# Patient Record
Sex: MALE | Race: OTHER | NOT HISPANIC OR LATINO | URBAN - METROPOLITAN AREA
[De-identification: names, ages, dates, MRNs, and addresses within clinical notes are randomized per-mention and may not be internally consistent; named-entity substitution may affect disease eponyms.]

---

## 2020-01-25 ENCOUNTER — INPATIENT (INPATIENT)
Facility: HOSPITAL | Age: 80
LOS: 2 days | Discharge: HOSPICE | DRG: 542 | End: 2020-01-28
Attending: INTERNAL MEDICINE | Admitting: INTERNAL MEDICINE
Payer: MEDICARE

## 2020-01-25 VITALS
DIASTOLIC BLOOD PRESSURE: 70 MMHG | HEIGHT: 69 IN | TEMPERATURE: 98 F | RESPIRATION RATE: 20 BRPM | HEART RATE: 76 BPM | SYSTOLIC BLOOD PRESSURE: 111 MMHG | OXYGEN SATURATION: 97 %

## 2020-01-25 DIAGNOSIS — E87.6 HYPOKALEMIA: ICD-10-CM

## 2020-01-25 DIAGNOSIS — Z98.49 CATARACT EXTRACTION STATUS, UNSPECIFIED EYE: Chronic | ICD-10-CM

## 2020-01-25 DIAGNOSIS — I26.99 OTHER PULMONARY EMBOLISM WITHOUT ACUTE COR PULMONALE: ICD-10-CM

## 2020-01-25 DIAGNOSIS — C61 MALIGNANT NEOPLASM OF PROSTATE: ICD-10-CM

## 2020-01-25 DIAGNOSIS — J84.112 IDIOPATHIC PULMONARY FIBROSIS: ICD-10-CM

## 2020-01-25 DIAGNOSIS — B37.81 CANDIDAL ESOPHAGITIS: ICD-10-CM

## 2020-01-25 DIAGNOSIS — I20.0 UNSTABLE ANGINA: ICD-10-CM

## 2020-01-25 DIAGNOSIS — Z98.890 OTHER SPECIFIED POSTPROCEDURAL STATES: Chronic | ICD-10-CM

## 2020-01-25 DIAGNOSIS — I10 ESSENTIAL (PRIMARY) HYPERTENSION: ICD-10-CM

## 2020-01-25 LAB
ALBUMIN SERPL ELPH-MCNC: 3.1 G/DL — LOW (ref 3.3–5)
ALP SERPL-CCNC: 59 U/L — SIGNIFICANT CHANGE UP (ref 40–120)
ALT FLD-CCNC: 17 U/L — SIGNIFICANT CHANGE UP (ref 10–45)
ANION GAP SERPL CALC-SCNC: 18 MMOL/L — HIGH (ref 5–17)
ANION GAP SERPL CALC-SCNC: 19 MMOL/L — HIGH (ref 5–17)
APTT BLD: 45.3 SEC — HIGH (ref 27.5–36.3)
AST SERPL-CCNC: 22 U/L — SIGNIFICANT CHANGE UP (ref 10–40)
BILIRUB SERPL-MCNC: 0.4 MG/DL — SIGNIFICANT CHANGE UP (ref 0.2–1.2)
BUN SERPL-MCNC: 15 MG/DL — SIGNIFICANT CHANGE UP (ref 7–23)
BUN SERPL-MCNC: 17 MG/DL — SIGNIFICANT CHANGE UP (ref 7–23)
CALCIUM SERPL-MCNC: 8.7 MG/DL — SIGNIFICANT CHANGE UP (ref 8.4–10.5)
CALCIUM SERPL-MCNC: 9.1 MG/DL — SIGNIFICANT CHANGE UP (ref 8.4–10.5)
CHLORIDE SERPL-SCNC: 102 MMOL/L — SIGNIFICANT CHANGE UP (ref 96–108)
CHLORIDE SERPL-SCNC: 98 MMOL/L — SIGNIFICANT CHANGE UP (ref 96–108)
CK MB CFR SERPL CALC: 2 NG/ML — SIGNIFICANT CHANGE UP (ref 0–6.7)
CK MB CFR SERPL CALC: 2.2 NG/ML — SIGNIFICANT CHANGE UP (ref 0–6.7)
CK SERPL-CCNC: 68 U/L — SIGNIFICANT CHANGE UP (ref 30–200)
CO2 SERPL-SCNC: 23 MMOL/L — SIGNIFICANT CHANGE UP (ref 22–31)
CO2 SERPL-SCNC: 27 MMOL/L — SIGNIFICANT CHANGE UP (ref 22–31)
CREAT SERPL-MCNC: 0.74 MG/DL — SIGNIFICANT CHANGE UP (ref 0.5–1.3)
CREAT SERPL-MCNC: 0.83 MG/DL — SIGNIFICANT CHANGE UP (ref 0.5–1.3)
GLUCOSE SERPL-MCNC: 143 MG/DL — HIGH (ref 70–99)
GLUCOSE SERPL-MCNC: 97 MG/DL — SIGNIFICANT CHANGE UP (ref 70–99)
HBA1C BLD-MCNC: 5.3 % — SIGNIFICANT CHANGE UP (ref 4–5.6)
HCT VFR BLD CALC: 28.7 % — LOW (ref 39–50)
HGB BLD-MCNC: 9.3 G/DL — LOW (ref 13–17)
INR BLD: 2.28 — HIGH (ref 0.88–1.16)
INR BLD: 2.91 — HIGH (ref 0.88–1.16)
LIDOCAIN IGE QN: 25 U/L — SIGNIFICANT CHANGE UP (ref 7–60)
MAGNESIUM SERPL-MCNC: 1.6 MG/DL — SIGNIFICANT CHANGE UP (ref 1.6–2.6)
MAGNESIUM SERPL-MCNC: 1.7 MG/DL — SIGNIFICANT CHANGE UP (ref 1.6–2.6)
MCHC RBC-ENTMCNC: 31 PG — SIGNIFICANT CHANGE UP (ref 27–34)
MCHC RBC-ENTMCNC: 32.4 GM/DL — SIGNIFICANT CHANGE UP (ref 32–36)
MCV RBC AUTO: 95.7 FL — SIGNIFICANT CHANGE UP (ref 80–100)
NRBC # BLD: 0 /100 WBCS — SIGNIFICANT CHANGE UP (ref 0–0)
NT-PROBNP SERPL-SCNC: 1018 PG/ML — HIGH (ref 0–300)
PLATELET # BLD AUTO: 350 K/UL — SIGNIFICANT CHANGE UP (ref 150–400)
POTASSIUM SERPL-MCNC: 3 MMOL/L — LOW (ref 3.5–5.3)
POTASSIUM SERPL-MCNC: 3.3 MMOL/L — LOW (ref 3.5–5.3)
POTASSIUM SERPL-SCNC: 3 MMOL/L — LOW (ref 3.5–5.3)
POTASSIUM SERPL-SCNC: 3.3 MMOL/L — LOW (ref 3.5–5.3)
PROT SERPL-MCNC: 6.5 G/DL — SIGNIFICANT CHANGE UP (ref 6–8.3)
PROTHROM AB SERPL-ACNC: 26.7 SEC — HIGH (ref 10–12.9)
PROTHROM AB SERPL-ACNC: 34.3 SEC — HIGH (ref 10–12.9)
RBC # BLD: 3 M/UL — LOW (ref 4.2–5.8)
RBC # FLD: 14.4 % — SIGNIFICANT CHANGE UP (ref 10.3–14.5)
SODIUM SERPL-SCNC: 143 MMOL/L — SIGNIFICANT CHANGE UP (ref 135–145)
SODIUM SERPL-SCNC: 144 MMOL/L — SIGNIFICANT CHANGE UP (ref 135–145)
TROPONIN T SERPL-MCNC: 0.01 NG/ML — SIGNIFICANT CHANGE UP (ref 0–0.01)
TROPONIN T SERPL-MCNC: 0.01 NG/ML — SIGNIFICANT CHANGE UP (ref 0–0.01)
TSH SERPL-MCNC: 1.35 UIU/ML — SIGNIFICANT CHANGE UP (ref 0.35–4.94)
WBC # BLD: 6.94 K/UL — SIGNIFICANT CHANGE UP (ref 3.8–10.5)
WBC # FLD AUTO: 6.94 K/UL — SIGNIFICANT CHANGE UP (ref 3.8–10.5)

## 2020-01-25 PROCEDURE — 93010 ELECTROCARDIOGRAM REPORT: CPT

## 2020-01-25 PROCEDURE — 71045 X-RAY EXAM CHEST 1 VIEW: CPT | Mod: 26

## 2020-01-25 PROCEDURE — 71275 CT ANGIOGRAPHY CHEST: CPT | Mod: 26

## 2020-01-25 PROCEDURE — 99291 CRITICAL CARE FIRST HOUR: CPT

## 2020-01-25 RX ORDER — CARVEDILOL PHOSPHATE 80 MG/1
12.5 CAPSULE, EXTENDED RELEASE ORAL EVERY 12 HOURS
Refills: 0 | Status: DISCONTINUED | OUTPATIENT
Start: 2020-01-25 | End: 2020-01-28

## 2020-01-25 RX ORDER — SODIUM CHLORIDE 9 MG/ML
1000 INJECTION INTRAMUSCULAR; INTRAVENOUS; SUBCUTANEOUS ONCE
Refills: 0 | Status: COMPLETED | OUTPATIENT
Start: 2020-01-25 | End: 2020-01-25

## 2020-01-25 RX ORDER — LOSARTAN POTASSIUM 100 MG/1
1 TABLET, FILM COATED ORAL
Qty: 0 | Refills: 0 | DISCHARGE

## 2020-01-25 RX ORDER — ASCORBIC ACID 60 MG
500 TABLET,CHEWABLE ORAL
Refills: 0 | Status: DISCONTINUED | OUTPATIENT
Start: 2020-01-25 | End: 2020-01-26

## 2020-01-25 RX ORDER — POTASSIUM CHLORIDE 20 MEQ
40 PACKET (EA) ORAL ONCE
Refills: 0 | Status: DISCONTINUED | OUTPATIENT
Start: 2020-01-25 | End: 2020-01-25

## 2020-01-25 RX ORDER — ASCORBIC ACID 60 MG
1 TABLET,CHEWABLE ORAL
Qty: 0 | Refills: 0 | DISCHARGE

## 2020-01-25 RX ORDER — SUCRALFATE 1 G
0.5 TABLET ORAL
Refills: 0 | Status: DISCONTINUED | OUTPATIENT
Start: 2020-01-25 | End: 2020-01-28

## 2020-01-25 RX ORDER — LOSARTAN POTASSIUM 100 MG/1
50 TABLET, FILM COATED ORAL DAILY
Refills: 0 | Status: DISCONTINUED | OUTPATIENT
Start: 2020-01-25 | End: 2020-01-28

## 2020-01-25 RX ORDER — ATORVASTATIN CALCIUM 80 MG/1
80 TABLET, FILM COATED ORAL AT BEDTIME
Refills: 0 | Status: DISCONTINUED | OUTPATIENT
Start: 2020-01-25 | End: 2020-01-26

## 2020-01-25 RX ORDER — NITROGLYCERIN 6.5 MG
1 CAPSULE, EXTENDED RELEASE ORAL
Qty: 0 | Refills: 0 | DISCHARGE

## 2020-01-25 RX ORDER — POTASSIUM CHLORIDE 20 MEQ
40 PACKET (EA) ORAL ONCE
Refills: 0 | Status: COMPLETED | OUTPATIENT
Start: 2020-01-25 | End: 2020-01-25

## 2020-01-25 RX ORDER — CARVEDILOL PHOSPHATE 80 MG/1
1 CAPSULE, EXTENDED RELEASE ORAL
Qty: 0 | Refills: 0 | DISCHARGE

## 2020-01-25 RX ORDER — ASPIRIN/CALCIUM CARB/MAGNESIUM 324 MG
325 TABLET ORAL ONCE
Refills: 0 | Status: COMPLETED | OUTPATIENT
Start: 2020-01-25 | End: 2020-01-25

## 2020-01-25 RX ORDER — APIXABAN 2.5 MG/1
5 TABLET, FILM COATED ORAL EVERY 12 HOURS
Refills: 0 | Status: DISCONTINUED | OUTPATIENT
Start: 2020-01-27 | End: 2020-01-28

## 2020-01-25 RX ORDER — NITROGLYCERIN 6.5 MG
0.4 CAPSULE, EXTENDED RELEASE ORAL ONCE
Refills: 0 | Status: COMPLETED | OUTPATIENT
Start: 2020-01-25 | End: 2020-01-25

## 2020-01-25 RX ORDER — LANOLIN ALCOHOL/MO/W.PET/CERES
6 CREAM (GRAM) TOPICAL AT BEDTIME
Refills: 0 | Status: DISCONTINUED | OUTPATIENT
Start: 2020-01-25 | End: 2020-01-28

## 2020-01-25 RX ORDER — BACLOFEN 100 %
1 POWDER (GRAM) MISCELLANEOUS
Qty: 0 | Refills: 0 | DISCHARGE

## 2020-01-25 RX ORDER — LATANOPROST 0.05 MG/ML
1 SOLUTION/ DROPS OPHTHALMIC; TOPICAL AT BEDTIME
Refills: 0 | Status: DISCONTINUED | OUTPATIENT
Start: 2020-01-25 | End: 2020-01-28

## 2020-01-25 RX ORDER — PREDNISOLONE SODIUM PHOSPHATE 1 %
1 DROPS OPHTHALMIC (EYE)
Qty: 0 | Refills: 0 | DISCHARGE

## 2020-01-25 RX ORDER — BIMATOPROST 0.3 MG/ML
1 SOLUTION/ DROPS OPHTHALMIC
Qty: 0 | Refills: 0 | DISCHARGE

## 2020-01-25 RX ORDER — PREDNISOLONE SODIUM PHOSPHATE 1 %
1 DROPS OPHTHALMIC (EYE) DAILY
Refills: 0 | Status: DISCONTINUED | OUTPATIENT
Start: 2020-01-25 | End: 2020-01-28

## 2020-01-25 RX ORDER — LANOLIN ALCOHOL/MO/W.PET/CERES
2 CREAM (GRAM) TOPICAL
Qty: 0 | Refills: 0 | DISCHARGE

## 2020-01-25 RX ORDER — TRAMADOL HYDROCHLORIDE 50 MG/1
25 TABLET ORAL ONCE
Refills: 0 | Status: DISCONTINUED | OUTPATIENT
Start: 2020-01-25 | End: 2020-01-25

## 2020-01-25 RX ORDER — APIXABAN 2.5 MG/1
10 TABLET, FILM COATED ORAL
Refills: 0 | Status: COMPLETED | OUTPATIENT
Start: 2020-01-25 | End: 2020-01-27

## 2020-01-25 RX ORDER — POLYETHYLENE GLYCOL 3350 17 G/17G
17 POWDER, FOR SOLUTION ORAL DAILY
Refills: 0 | Status: DISCONTINUED | OUTPATIENT
Start: 2020-01-25 | End: 2020-01-28

## 2020-01-25 RX ORDER — DORZOLAMIDE HYDROCHLORIDE TIMOLOL MALEATE 20; 5 MG/ML; MG/ML
1 SOLUTION/ DROPS OPHTHALMIC
Qty: 0 | Refills: 0 | DISCHARGE

## 2020-01-25 RX ORDER — DORZOLAMIDE HYDROCHLORIDE TIMOLOL MALEATE 20; 5 MG/ML; MG/ML
1 SOLUTION/ DROPS OPHTHALMIC
Refills: 0 | Status: DISCONTINUED | OUTPATIENT
Start: 2020-01-25 | End: 2020-01-28

## 2020-01-25 RX ORDER — ASPIRIN/CALCIUM CARB/MAGNESIUM 324 MG
81 TABLET ORAL DAILY
Refills: 0 | Status: DISCONTINUED | OUTPATIENT
Start: 2020-01-26 | End: 2020-01-26

## 2020-01-25 RX ORDER — SUCRALFATE 1 G
5 TABLET ORAL
Qty: 0 | Refills: 0 | DISCHARGE

## 2020-01-25 RX ORDER — SENNA PLUS 8.6 MG/1
2 TABLET ORAL AT BEDTIME
Refills: 0 | Status: DISCONTINUED | OUTPATIENT
Start: 2020-01-25 | End: 2020-01-28

## 2020-01-25 RX ADMIN — TRAMADOL HYDROCHLORIDE 25 MILLIGRAM(S): 50 TABLET ORAL at 23:10

## 2020-01-25 RX ADMIN — LOSARTAN POTASSIUM 50 MILLIGRAM(S): 100 TABLET, FILM COATED ORAL at 16:44

## 2020-01-25 RX ADMIN — Medication 0.4 MILLIGRAM(S): at 09:15

## 2020-01-25 RX ADMIN — Medication 325 MILLIGRAM(S): at 09:09

## 2020-01-25 RX ADMIN — SENNA PLUS 2 TABLET(S): 8.6 TABLET ORAL at 22:08

## 2020-01-25 RX ADMIN — TRAMADOL HYDROCHLORIDE 25 MILLIGRAM(S): 50 TABLET ORAL at 22:07

## 2020-01-25 RX ADMIN — CARVEDILOL PHOSPHATE 12.5 MILLIGRAM(S): 80 CAPSULE, EXTENDED RELEASE ORAL at 16:44

## 2020-01-25 RX ADMIN — SODIUM CHLORIDE 1000 MILLILITER(S): 9 INJECTION INTRAMUSCULAR; INTRAVENOUS; SUBCUTANEOUS at 10:45

## 2020-01-25 RX ADMIN — Medication 40 MILLIEQUIVALENT(S): at 22:08

## 2020-01-25 RX ADMIN — Medication 40 MILLIEQUIVALENT(S): at 10:51

## 2020-01-25 RX ADMIN — DORZOLAMIDE HYDROCHLORIDE TIMOLOL MALEATE 1 DROP(S): 20; 5 SOLUTION/ DROPS OPHTHALMIC at 22:09

## 2020-01-25 RX ADMIN — Medication 6 MILLIGRAM(S): at 22:09

## 2020-01-25 RX ADMIN — ATORVASTATIN CALCIUM 80 MILLIGRAM(S): 80 TABLET, FILM COATED ORAL at 22:10

## 2020-01-25 RX ADMIN — Medication 1 DROP(S): at 22:09

## 2020-01-25 RX ADMIN — LATANOPROST 1 DROP(S): 0.05 SOLUTION/ DROPS OPHTHALMIC; TOPICAL at 22:11

## 2020-01-25 NOTE — ED ADULT NURSE NOTE - OBJECTIVE STATEMENT
78 yo M pmhx cardiac stent placement, DVT/ PE, hyperlipidemia, emphysema, ballder CA/ bone CA B[facility]EMS [facility] c/o chest pain since waking up this AM. Pt reports crushing chest pain, made better with rest and worse with movement changes. PT recevied 81mg of ASA and 1 tab of Nitro this AM at 7:10. upon arrival pt rates pain 7/10, denies radiation. EKG completed upon arrival, Cath lab notified. Pt is AOx3, lung sounds diminished, skin warm and dry. Pt reporting back pain despite lidocaine patch present on back, BP WDL bilaterally. Pt denies SOB, V/D, dizziness, lightheadedness, on cardiac monitor, will continue to assess.

## 2020-01-25 NOTE — ED PROVIDER NOTE - CLINICAL SUMMARY MEDICAL DECISION MAKING FREE TEXT BOX
Patient in ED from [facility] concern for CP today.  EKG completed on arrival and concerning for wellen's and case is discussed with interventional cardiologist.  Interventionalist, Dr. Mayo, who has reviewed EKG and requests cardiology fellow to come evaluate patient in ED.  Cardio fellow in ED to evaluate and bedside echo is performed indicating a preserved EF.  Initial trop negative.  Patient with some CP relief with SL nitro.  He is given ASA.  CXR showing bilateral infiltrates - no leukocytosis, no fever. Patient in ED from [facility] concern for CP today.  EKG completed on arrival and concerning for wellen's and case is discussed with interventional cardiologist.  Interventionalist, Dr. Mayo, who has reviewed EKG and requests cardiology fellow to come evaluate patient in ED.  Cardio fellow in ED to evaluate and bedside echo is performed indicating a preserved EF.  Initial trop negative.  Patient with some CP relief with SL nitro.  He is given ASA.  CXR showing bilateral infiltrates - no leukocytosis, no fever.  CTA completed and without evidence of PE.  Low clinical concern for PNA, will hold off on abx therapy at this time.  Case is discussed with admitting cardiologist on call, Dr. Trujillo, who is in agreement to telemetry admission.  Plan is discussed with patient as well as his wife via telephone conversation - both of whom are in agreement.  Patient is stable and ready for admission at this time.

## 2020-01-25 NOTE — H&P ADULT - NSHPSOCIALHISTORY_GEN_ALL_CORE
-former smoker (quit 34 yrs ago, 3PPD x25 years)  -no etoh or illicits  -currently at [employer]  -has spouse and adult child  -retired

## 2020-01-25 NOTE — ED ADULT NURSE REASSESSMENT NOTE - NS ED NURSE REASSESS COMMENT FT1
Pt given sublingual nitro 0.4 tablet, prior to administration BP 140s/80s s/p administration BP drops to 60s/40s, pt given 400mL NS with pressure bag, repeat EKG completed, cardiology consulted. Pt moved to resus room where BP stabilized to 120s/70s. Pt taken to CTA to r/o PE/ dissection. Currently 156/82, BP WDL bilaterally, sating 98% on 2L, on continuous cardiac monitor, will continue to monitor.

## 2020-01-25 NOTE — H&P ADULT - PROBLEM SELECTOR PLAN 4
-not on home o2  -continue to monitor #COPD    -not on home o2  -CTA chest revealed pulmonary emphysema and pulmonary fibrosis; Probable bronchial mucoid impaction in the right lower lobe. Cannot exclude superimposed pneumonia in the right lower lobe  -afebrile, no leukocytosis, and minimal cough   -no abx at this time, continue to monitor

## 2020-01-25 NOTE — H&P ADULT - PROBLEM SELECTOR PLAN 2
-SBP 180s-190s on [facility]  -continue home coreg and losartan  -continue to monitor    #Hypotension  -BP to 60s/40s in ER s/p nitro, normalized after IV fluids

## 2020-01-25 NOTE — ED PROVIDER NOTE - CARE PLAN
Principal Discharge DX:	Chest pain, unspecified type  Secondary Diagnosis:	Hypokalemia  Secondary Diagnosis:	Abnormal EKG

## 2020-01-25 NOTE — H&P ADULT - PROBLEM SELECTOR PLAN 6
- K 3.0 on admission, received supplementation and improved to 3.3  -ordered additional 40meq  -continue to trend    DVT PPx- eliquis    Dispo- pending possible ischemic eval  -back to [place] when medically ready    Case d/w Dr. Trujillo

## 2020-01-25 NOTE — H&P ADULT - NSHPLABSRESULTS_GEN_ALL_CORE
Labs:                           9.3    6.94  )-----------( 350      ( 25 Jan 2020 09:05 )             28.7       01-25    144  |  102  |  15  ----------------------------<  97  3.3<L>   |  23  |  0.74    Ca    8.7      25 Jan 2020 18:33  Mg     1.6     01-25    TPro  6.5  /  Alb  3.1<L>  /  TBili  0.4  /  DBili  x   /  AST  22  /  ALT  17  /  AlkPhos  59  01-25      PT/INR - ( 25 Jan 2020 18:32 )   PT: 26.7 sec;   INR: 2.28          PTT - ( 25 Jan 2020 09:05 )  PTT:45.3 sec    CARDIAC MARKERS ( 25 Jan 2020 18:33 )  x     / 0.01 ng/mL / 68 U/L / x     / 2.2 ng/mL  CARDIAC MARKERS ( 25 Jan 2020 09:05 )  x     / 0.01 ng/mL / 70 U/L / x     / 2.0 ng/mL      EKG: Labs:                           9.3    6.94  )-----------( 350      ( 25 Jan 2020 09:05 )             28.7       01-25    144  |  102  |  15  ----------------------------<  97  3.3<L>   |  23  |  0.74    Ca    8.7      25 Jan 2020 18:33  Mg     1.6     01-25    TPro  6.5  /  Alb  3.1<L>  /  TBili  0.4  /  DBili  x   /  AST  22  /  ALT  17  /  AlkPhos  59  01-25      PT/INR - ( 25 Jan 2020 18:32 )   PT: 26.7 sec;   INR: 2.28          PTT - ( 25 Jan 2020 09:05 )  PTT:45.3 sec    CARDIAC MARKERS ( 25 Jan 2020 18:33 )  x     / 0.01 ng/mL / 68 U/L / x     / 2.2 ng/mL  CARDIAC MARKERS ( 25 Jan 2020 09:05 )  x     / 0.01 ng/mL / 70 U/L / x     / 2.0 ng/mL      EKG: NSR 76bpm, non-specific ST-T changes Labs:                           9.3    6.94  )-----------( 350      ( 25 Jan 2020 09:05 )             28.7       01-25    144  |  102  |  15  ----------------------------<  97  3.3<L>   |  23  |  0.74    Ca    8.7      25 Jan 2020 18:33  Mg     1.6     01-25    TPro  6.5  /  Alb  3.1<L>  /  TBili  0.4  /  DBili  x   /  AST  22  /  ALT  17  /  AlkPhos  59  01-25      PT/INR - ( 25 Jan 2020 18:32 )   PT: 26.7 sec;   INR: 2.28          PTT - ( 25 Jan 2020 09:05 )  PTT:45.3 sec    CARDIAC MARKERS ( 25 Jan 2020 18:33 )  x     / 0.01 ng/mL / 68 U/L / x     / 2.2 ng/mL  CARDIAC MARKERS ( 25 Jan 2020 09:05 )  x     / 0.01 ng/mL / 70 U/L / x     / 2.0 ng/mL      EKG: NSR 76bpm, non-specific ST-T changes    CTA Chest PE procol  No pulmonary embolism.  Pulmonary emphysema and pulmonary fibrosis.  Probable bronchial mucoid impaction in the right lower lobe. Cannot exclude superimposed pneumonia in the right lower lobe.  Evidence of bony metastasis.    CXR- bilateral infiltrates, discoid changes

## 2020-01-25 NOTE — H&P ADULT - PROBLEM SELECTOR PLAN 1
-Known Hx CAD s/p stent at [facility] 2 years ago and most recent cath there within 1 year with no intervention.  -Currently without anginal symptoms. Trop neg x2, EKG SR with non-specific ST-T changes  -telemetry monitoring  -A1c WNL, f/u lipid panel  -obtain cath report from [facility]  -continue aspirin 81mg, atorva 80mg, coreg 12.5mg BID, losartan 50mg daily  -possible ischemic eval on Monday

## 2020-01-25 NOTE — ED PROVIDER NOTE - CONSTITUTIONAL, MLM
normal... Thin.  Non toxic appearing, awake, alert, oriented to person, place, time/situation and in no apparent distress.

## 2020-01-25 NOTE — H&P ADULT - PROBLEM SELECTOR PLAN 3
-dx at [facility] last week. CTA chest negative for PE here  -continue eliquis 10mg BID until 1/26, transition to 5mg BID on 1/27

## 2020-01-25 NOTE — ED PROVIDER NOTE - OBJECTIVE STATEMENT
79 year old male with history of HTN, HLD, DVT/PE (on Xarelto), CAD (s/p cardiac stent placement apx 1.5 yrs ago at [facility]), metastatic disease (?unknown primary CA), pulmonary fibrosis (not home oxygen dep) presents to ED with concern for chest pain today upon awakening.  Patient notes pain to be midsternal with leftward radiation and states it has been constant since onset.  He describes discomfort as chest pressure.  Patient notes with prior "heart attack" he did not experience CP, rather he was SOB.  Patient also admits to ongoing mid back pain x 1 month, unchanged today, as well as decreased appetite over the past few weeks.  He denies associated fever, chills, changes to baseline shortness of breath, abdominal pain, nausea, emesis, changes to bowel movements, peripheral edema, rashes, recent travel or any additional acute complaints or concerns at this time.

## 2020-01-25 NOTE — H&P ADULT - ASSESSMENT
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

## 2020-01-25 NOTE — H&P ADULT - NSICDXPASTMEDICALHX_GEN_ALL_CORE_FT
PAST MEDICAL HISTORY:  Emphysema of lung     Hyperlipidemia     Neoplasm of bone     Pulmonary fibrosis     Stented coronary artery

## 2020-01-25 NOTE — H&P ADULT - ATTENDING COMMENTS
atypical chest pain  no further work up had cath six months ago his current cardiac enzymes are negative  he has new arm weakness needs to see neuro to rule out cord compression  pulmonary fibrosis pulmonary consult

## 2020-01-25 NOTE — ED ADULT NURSE NOTE - PMH
Bladder neoplasm    Emphysema of lung    Hyperlipidemia    Neoplasm of bone    Stented coronary artery

## 2020-01-25 NOTE — ED PROVIDER NOTE - CRITICAL CARE PROVIDED
Patient's wife/additional history taking/interpretation of diagnostic studies/consultation with other physicians/consult w/ pt's family directly relating to pts condition/documentation/telephone consultation with the patient's family/direct patient care (not related to procedure)

## 2020-01-25 NOTE — H&P ADULT - HISTORY OF PRESENT ILLNESS
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 He states he was laying in bed when he suddenly experienced non-radiating, non-exertional 6/10 midsternal chest pressure. No associated SOB, dizziness, palpitations, N/V, or diaphoresis. Has never experienced pain like this before, despite having stents placed in past. Resolved only after receiving SL nitro by EMS and since then has not reoccurred. Also reports poor PO intake due to food tasting bad (side effect of chemo)  In ER, patient CP-free, afebrile and initially hemodynamically stable but then became hypotensive to 60s/40s after receiving additional dose of SL nitro, Cards fellow in ED did bedside echo showing preserved EF, received IVF bolus and BP normalized. EKG SR with non-specific ST-T changes. Trop negative x1, BNP 1018. CXR with bilateral infiltrates, CTA PE protocol negative for PE, showing pulmonary emphysema and pulmonary fibrosis, probable bronchial mucoid impaction in the right lower lobe. HE received aspirin 325mg.     Admitted to [facility] for management of unstable angina. 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 He states he was laying in bed when he suddenly experienced non-radiating, non-exertional 6/10 midsternal chest pressure. No associated SOB, dizziness, palpitations, N/V, or diaphoresis. Has never experienced pain like this before, despite having stents placed in past. Resolved only after receiving SL nitro by EMS and since then has not reoccurred. Also reports poor PO intake due to food tasting bad (side effect of chemo)  In ER, patient CP-free, afebrile and initially hemodynamically stable but then became hypotensive to 60s/40s after receiving additional dose of SL nitro, Cards fellow in ED did bedside echo showing preserved EF, received IVF bolus and BP normalized. EKG SR with non-specific ST-T changes. Trop negative x1, BNP 1018. CXR with bilateral infiltrates, CTA PE protocol negative for PE, showing pulmonary emphysema and pulmonary fibrosis, probable bronchial mucoid impaction in the right lower lobe. HE received aspirin 325mg.     Admitted to [facility] for management of unstable angina.

## 2020-01-26 DIAGNOSIS — R13.10 DYSPHAGIA, UNSPECIFIED: ICD-10-CM

## 2020-01-26 DIAGNOSIS — Z51.5 ENCOUNTER FOR PALLIATIVE CARE: ICD-10-CM

## 2020-01-26 DIAGNOSIS — J43.9 EMPHYSEMA, UNSPECIFIED: ICD-10-CM

## 2020-01-26 DIAGNOSIS — R07.89 OTHER CHEST PAIN: ICD-10-CM

## 2020-01-26 DIAGNOSIS — J96.01 ACUTE RESPIRATORY FAILURE WITH HYPOXIA: ICD-10-CM

## 2020-01-26 LAB
ALBUMIN SERPL ELPH-MCNC: 3.3 G/DL — SIGNIFICANT CHANGE UP (ref 3.3–5)
ALP SERPL-CCNC: 68 U/L — SIGNIFICANT CHANGE UP (ref 40–120)
ALT FLD-CCNC: 15 U/L — SIGNIFICANT CHANGE UP (ref 10–45)
ANION GAP SERPL CALC-SCNC: 19 MMOL/L — HIGH (ref 5–17)
ANION GAP SERPL CALC-SCNC: 20 MMOL/L — HIGH (ref 5–17)
APTT BLD: 39.7 SEC — HIGH (ref 27.5–36.3)
APTT BLD: 41.6 SEC — HIGH (ref 27.5–36.3)
AST SERPL-CCNC: 21 U/L — SIGNIFICANT CHANGE UP (ref 10–40)
BASOPHILS # BLD AUTO: 0.04 K/UL — SIGNIFICANT CHANGE UP (ref 0–0.2)
BASOPHILS # BLD AUTO: 0.04 K/UL — SIGNIFICANT CHANGE UP (ref 0–0.2)
BASOPHILS NFR BLD AUTO: 0.5 % — SIGNIFICANT CHANGE UP (ref 0–2)
BASOPHILS NFR BLD AUTO: 0.6 % — SIGNIFICANT CHANGE UP (ref 0–2)
BILIRUB SERPL-MCNC: 0.4 MG/DL — SIGNIFICANT CHANGE UP (ref 0.2–1.2)
BUN SERPL-MCNC: 14 MG/DL — SIGNIFICANT CHANGE UP (ref 7–23)
BUN SERPL-MCNC: 15 MG/DL — SIGNIFICANT CHANGE UP (ref 7–23)
CALCIUM SERPL-MCNC: 8.8 MG/DL — SIGNIFICANT CHANGE UP (ref 8.4–10.5)
CALCIUM SERPL-MCNC: 9.3 MG/DL — SIGNIFICANT CHANGE UP (ref 8.4–10.5)
CHLORIDE SERPL-SCNC: 101 MMOL/L — SIGNIFICANT CHANGE UP (ref 96–108)
CHLORIDE SERPL-SCNC: 99 MMOL/L — SIGNIFICANT CHANGE UP (ref 96–108)
CHOLEST SERPL-MCNC: 159 MG/DL — SIGNIFICANT CHANGE UP (ref 10–199)
CK MB CFR SERPL CALC: 3.2 NG/ML — SIGNIFICANT CHANGE UP (ref 0–6.7)
CK SERPL-CCNC: 80 U/L — SIGNIFICANT CHANGE UP (ref 30–200)
CO2 SERPL-SCNC: 24 MMOL/L — SIGNIFICANT CHANGE UP (ref 22–31)
CO2 SERPL-SCNC: 26 MMOL/L — SIGNIFICANT CHANGE UP (ref 22–31)
CREAT SERPL-MCNC: 0.68 MG/DL — SIGNIFICANT CHANGE UP (ref 0.5–1.3)
CREAT SERPL-MCNC: 0.69 MG/DL — SIGNIFICANT CHANGE UP (ref 0.5–1.3)
EOSINOPHIL # BLD AUTO: 0.16 K/UL — SIGNIFICANT CHANGE UP (ref 0–0.5)
EOSINOPHIL # BLD AUTO: 0.18 K/UL — SIGNIFICANT CHANGE UP (ref 0–0.5)
EOSINOPHIL NFR BLD AUTO: 2.1 % — SIGNIFICANT CHANGE UP (ref 0–6)
EOSINOPHIL NFR BLD AUTO: 2.6 % — SIGNIFICANT CHANGE UP (ref 0–6)
GLUCOSE BLDC GLUCOMTR-MCNC: 113 MG/DL — HIGH (ref 70–99)
GLUCOSE SERPL-MCNC: 107 MG/DL — HIGH (ref 70–99)
GLUCOSE SERPL-MCNC: 114 MG/DL — HIGH (ref 70–99)
HCT VFR BLD CALC: 32.6 % — LOW (ref 39–50)
HCT VFR BLD CALC: 36.5 % — LOW (ref 39–50)
HDLC SERPL-MCNC: 26 MG/DL — LOW
HGB BLD-MCNC: 10 G/DL — LOW (ref 13–17)
HGB BLD-MCNC: 11.2 G/DL — LOW (ref 13–17)
IMM GRANULOCYTES NFR BLD AUTO: 0.8 % — SIGNIFICANT CHANGE UP (ref 0–1.5)
IMM GRANULOCYTES NFR BLD AUTO: 1.3 % — SIGNIFICANT CHANGE UP (ref 0–1.5)
INR BLD: 1.73 — HIGH (ref 0.88–1.16)
INR BLD: 2.19 — HIGH (ref 0.88–1.16)
LIPID PNL WITH DIRECT LDL SERPL: 107 MG/DL — HIGH
LYMPHOCYTES # BLD AUTO: 1.41 K/UL — SIGNIFICANT CHANGE UP (ref 1–3.3)
LYMPHOCYTES # BLD AUTO: 1.45 K/UL — SIGNIFICANT CHANGE UP (ref 1–3.3)
LYMPHOCYTES # BLD AUTO: 19.3 % — SIGNIFICANT CHANGE UP (ref 13–44)
LYMPHOCYTES # BLD AUTO: 20.1 % — SIGNIFICANT CHANGE UP (ref 13–44)
MAGNESIUM SERPL-MCNC: 1.6 MG/DL — SIGNIFICANT CHANGE UP (ref 1.6–2.6)
MCHC RBC-ENTMCNC: 30.1 PG — SIGNIFICANT CHANGE UP (ref 27–34)
MCHC RBC-ENTMCNC: 30.6 PG — SIGNIFICANT CHANGE UP (ref 27–34)
MCHC RBC-ENTMCNC: 30.7 GM/DL — LOW (ref 32–36)
MCHC RBC-ENTMCNC: 30.7 GM/DL — LOW (ref 32–36)
MCV RBC AUTO: 98.1 FL — SIGNIFICANT CHANGE UP (ref 80–100)
MCV RBC AUTO: 99.7 FL — SIGNIFICANT CHANGE UP (ref 80–100)
MONOCYTES # BLD AUTO: 0.66 K/UL — SIGNIFICANT CHANGE UP (ref 0–0.9)
MONOCYTES # BLD AUTO: 0.67 K/UL — SIGNIFICANT CHANGE UP (ref 0–0.9)
MONOCYTES NFR BLD AUTO: 8.9 % — SIGNIFICANT CHANGE UP (ref 2–14)
MONOCYTES NFR BLD AUTO: 9.4 % — SIGNIFICANT CHANGE UP (ref 2–14)
NEUTROPHILS # BLD AUTO: 4.64 K/UL — SIGNIFICANT CHANGE UP (ref 1.8–7.4)
NEUTROPHILS # BLD AUTO: 5.14 K/UL — SIGNIFICANT CHANGE UP (ref 1.8–7.4)
NEUTROPHILS NFR BLD AUTO: 66 % — SIGNIFICANT CHANGE UP (ref 43–77)
NEUTROPHILS NFR BLD AUTO: 68.4 % — SIGNIFICANT CHANGE UP (ref 43–77)
NRBC # BLD: 0 /100 WBCS — SIGNIFICANT CHANGE UP (ref 0–0)
NRBC # BLD: 0 /100 WBCS — SIGNIFICANT CHANGE UP (ref 0–0)
PLATELET # BLD AUTO: 298 K/UL — SIGNIFICANT CHANGE UP (ref 150–400)
PLATELET # BLD AUTO: 368 K/UL — SIGNIFICANT CHANGE UP (ref 150–400)
POTASSIUM SERPL-MCNC: 3.6 MMOL/L — SIGNIFICANT CHANGE UP (ref 3.5–5.3)
POTASSIUM SERPL-MCNC: 3.7 MMOL/L — SIGNIFICANT CHANGE UP (ref 3.5–5.3)
POTASSIUM SERPL-SCNC: 3.6 MMOL/L — SIGNIFICANT CHANGE UP (ref 3.5–5.3)
POTASSIUM SERPL-SCNC: 3.7 MMOL/L — SIGNIFICANT CHANGE UP (ref 3.5–5.3)
PROT SERPL-MCNC: 7 G/DL — SIGNIFICANT CHANGE UP (ref 6–8.3)
PROTHROM AB SERPL-ACNC: 20 SEC — HIGH (ref 10–12.9)
PROTHROM AB SERPL-ACNC: 25.6 SEC — HIGH (ref 10–12.9)
RBC # BLD: 3.27 M/UL — LOW (ref 4.2–5.8)
RBC # BLD: 3.72 M/UL — LOW (ref 4.2–5.8)
RBC # FLD: 14.3 % — SIGNIFICANT CHANGE UP (ref 10.3–14.5)
RBC # FLD: 14.4 % — SIGNIFICANT CHANGE UP (ref 10.3–14.5)
SODIUM SERPL-SCNC: 144 MMOL/L — SIGNIFICANT CHANGE UP (ref 135–145)
SODIUM SERPL-SCNC: 145 MMOL/L — SIGNIFICANT CHANGE UP (ref 135–145)
TOTAL CHOLESTEROL/HDL RATIO MEASUREMENT: 6.1 RATIO — SIGNIFICANT CHANGE UP (ref 3.4–9.6)
TRIGL SERPL-MCNC: 128 MG/DL — SIGNIFICANT CHANGE UP (ref 10–149)
TROPONIN T SERPL-MCNC: <0.01 NG/ML — SIGNIFICANT CHANGE UP (ref 0–0.01)
TROPONIN T SERPL-MCNC: <0.01 NG/ML — SIGNIFICANT CHANGE UP (ref 0–0.01)
WBC # BLD: 7.02 K/UL — SIGNIFICANT CHANGE UP (ref 3.8–10.5)
WBC # BLD: 7.52 K/UL — SIGNIFICANT CHANGE UP (ref 3.8–10.5)
WBC # FLD AUTO: 7.02 K/UL — SIGNIFICANT CHANGE UP (ref 3.8–10.5)
WBC # FLD AUTO: 7.52 K/UL — SIGNIFICANT CHANGE UP (ref 3.8–10.5)

## 2020-01-26 PROCEDURE — 99222 1ST HOSP IP/OBS MODERATE 55: CPT

## 2020-01-26 PROCEDURE — 72146 MRI CHEST SPINE W/O DYE: CPT | Mod: 26

## 2020-01-26 PROCEDURE — 99232 SBSQ HOSP IP/OBS MODERATE 35: CPT

## 2020-01-26 PROCEDURE — 72141 MRI NECK SPINE W/O DYE: CPT | Mod: 26

## 2020-01-26 PROCEDURE — 99223 1ST HOSP IP/OBS HIGH 75: CPT

## 2020-01-26 PROCEDURE — 72148 MRI LUMBAR SPINE W/O DYE: CPT | Mod: 26

## 2020-01-26 RX ORDER — MAGNESIUM SULFATE 500 MG/ML
2 VIAL (ML) INJECTION ONCE
Refills: 0 | Status: DISCONTINUED | OUTPATIENT
Start: 2020-01-26 | End: 2020-01-26

## 2020-01-26 RX ORDER — MORPHINE SULFATE 50 MG/1
2 CAPSULE, EXTENDED RELEASE ORAL ONCE
Refills: 0 | Status: DISCONTINUED | OUTPATIENT
Start: 2020-01-26 | End: 2020-01-26

## 2020-01-26 RX ORDER — MAGNESIUM SULFATE 500 MG/ML
2 VIAL (ML) INJECTION ONCE
Refills: 0 | Status: COMPLETED | OUTPATIENT
Start: 2020-01-26 | End: 2020-01-26

## 2020-01-26 RX ORDER — SODIUM CHLORIDE 0.65 %
1 AEROSOL, SPRAY (ML) NASAL EVERY 4 HOURS
Refills: 0 | Status: DISCONTINUED | OUTPATIENT
Start: 2020-01-26 | End: 2020-01-28

## 2020-01-26 RX ORDER — POTASSIUM CHLORIDE 20 MEQ
40 PACKET (EA) ORAL ONCE
Refills: 0 | Status: COMPLETED | OUTPATIENT
Start: 2020-01-26 | End: 2020-01-26

## 2020-01-26 RX ORDER — AMLODIPINE BESYLATE 2.5 MG/1
5 TABLET ORAL DAILY
Refills: 0 | Status: DISCONTINUED | OUTPATIENT
Start: 2020-01-26 | End: 2020-01-28

## 2020-01-26 RX ADMIN — APIXABAN 10 MILLIGRAM(S): 2.5 TABLET, FILM COATED ORAL at 06:58

## 2020-01-26 RX ADMIN — Medication 0.5 GRAM(S): at 23:50

## 2020-01-26 RX ADMIN — AMLODIPINE BESYLATE 5 MILLIGRAM(S): 2.5 TABLET ORAL at 12:25

## 2020-01-26 RX ADMIN — DORZOLAMIDE HYDROCHLORIDE TIMOLOL MALEATE 1 DROP(S): 20; 5 SOLUTION/ DROPS OPHTHALMIC at 06:59

## 2020-01-26 RX ADMIN — Medication 500 MILLIGRAM(S): at 06:58

## 2020-01-26 RX ADMIN — APIXABAN 10 MILLIGRAM(S): 2.5 TABLET, FILM COATED ORAL at 18:13

## 2020-01-26 RX ADMIN — CARVEDILOL PHOSPHATE 12.5 MILLIGRAM(S): 80 CAPSULE, EXTENDED RELEASE ORAL at 18:13

## 2020-01-26 RX ADMIN — POLYETHYLENE GLYCOL 3350 17 GRAM(S): 17 POWDER, FOR SOLUTION ORAL at 17:50

## 2020-01-26 RX ADMIN — Medication 40 MILLIEQUIVALENT(S): at 12:26

## 2020-01-26 RX ADMIN — DORZOLAMIDE HYDROCHLORIDE TIMOLOL MALEATE 1 DROP(S): 20; 5 SOLUTION/ DROPS OPHTHALMIC at 17:52

## 2020-01-26 RX ADMIN — SENNA PLUS 2 TABLET(S): 8.6 TABLET ORAL at 23:48

## 2020-01-26 RX ADMIN — MORPHINE SULFATE 2 MILLIGRAM(S): 50 CAPSULE, EXTENDED RELEASE ORAL at 17:47

## 2020-01-26 RX ADMIN — LOSARTAN POTASSIUM 50 MILLIGRAM(S): 100 TABLET, FILM COATED ORAL at 07:01

## 2020-01-26 RX ADMIN — Medication 50 GRAM(S): at 17:51

## 2020-01-26 RX ADMIN — Medication 1 TABLET(S): at 12:26

## 2020-01-26 RX ADMIN — CARVEDILOL PHOSPHATE 12.5 MILLIGRAM(S): 80 CAPSULE, EXTENDED RELEASE ORAL at 06:59

## 2020-01-26 RX ADMIN — Medication 1 DROP(S): at 17:49

## 2020-01-26 RX ADMIN — Medication 6 MILLIGRAM(S): at 23:48

## 2020-01-26 RX ADMIN — LATANOPROST 1 DROP(S): 0.05 SOLUTION/ DROPS OPHTHALMIC; TOPICAL at 23:48

## 2020-01-26 RX ADMIN — Medication 1 SPRAY(S): at 23:51

## 2020-01-26 NOTE — SWALLOW BEDSIDE ASSESSMENT ADULT - SWALLOW EVAL: RECOMMENDED FEEDING/EATING TECHNIQUES
allow for swallow between intakes/position upright (90 degrees)/small sips/bites/maintain upright posture during/after eating for 30 mins/no straws/oral hygiene/alternate food with liquid

## 2020-01-26 NOTE — PROGRESS NOTE ADULT - SUBJECTIVE AND OBJECTIVE BOX
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 within past year without intervention, who was BIBEMS from [facility] for chest pain since this morning. He states he was laying in bed when he suddenly experienced non-radiating, non-exertional 6/10 midsternal chest pressure. No associated SOB, dizziness, palpitations, N/V, or diaphoresis. Has never experienced pain like this before, despite having stents placed in past. Resolved only after receiving SL nitro by EMS and since then has not reoccurred. Also reports poor PO intake due to food tasting bad (side effect of chemo)    [facility]. He was until recently on Perfenidone which he stopped about 1-2 weeks ago as he was not able to tolerate the side effects. The patient reports that he has been having worsening shortness of breath even with speaking a few sentences. He has also noticed a weakness in all 4 extremities since last night. Reports a significant amount of productive cough which has been increasing in quantity and has been getting harder for him to clear. Is not on any inhalers at home. He is aware of mets to the bone but is unclear if they were to the spine. No fevers or chills.     ROS:  Per HPI, otherwise 12 point ROS negative.       PAST MEDICAL & SURGICAL HISTORY:  Pulmonary fibrosis  Neoplasm of bone  Hyperlipidemia  Emphysema of lung  Stented coronary artery  H/O hernia repair  S/P cataract surgery      FAMILY HISTORY:  No pertinent family history in first degree relatives      SOCIAL HISTORY:  Smoking Status: [ ] Current, [ x] Former, [ ] Never  Pack Years: 3PPD for 20 years, 60 pack year, quit 37 years ago    MEDICATIONS:  Pulmonary:    Antimicrobials:    Anticoagulants:  apixaban 10 milliGRAM(s) Oral two times a day    Onc:    GI/:  polyethylene glycol 3350 17 Gram(s) Oral daily  senna 2 Tablet(s) Oral at bedtime  sucralfate suspension 0.5 Gram(s) Oral four times a day    Endocrine:  atorvastatin 80 milliGRAM(s) Oral at bedtime    Cardiac:  amLODIPine   Tablet 5 milliGRAM(s) Oral daily  carvedilol 12.5 milliGRAM(s) Oral every 12 hours  losartan 50 milliGRAM(s) Oral daily    Other Medications:  ascorbic acid 500 milliGRAM(s) Oral two times a day  atorvastatin 80 milliGRAM(s) Oral at bedtime  dorzolamide 2%/timolol 0.5% Ophthalmic Solution 1 Drop(s) Both EYES two times a day  latanoprost 0.005% Ophthalmic Solution 1 Drop(s) Both EYES at bedtime  magnesium sulfate  IVPB 2 Gram(s) IV Intermittent once  melatonin 6 milliGRAM(s) Oral at bedtime  multivitamin 1 Tablet(s) Oral daily  prednisoLONE acetate 1% Suspension 1 Drop(s) Both EYES daily      Allergies    No Known Allergies    Intolerances        Vital Signs Last 24 Hrs  T(C): 36.3 (26 Jan 2020 14:32), Max: 36.6 (25 Jan 2020 15:40)  T(F): 97.4 (26 Jan 2020 14:32), Max: 97.8 (25 Jan 2020 15:40)  HR: 72 (26 Jan 2020 08:30) (72 - 80)  BP: 159/88 (26 Jan 2020 08:30) (156/82 - 193/103)  BP(mean): 106 (25 Jan 2020 15:40) (106 - 106)  RR: 18 (26 Jan 2020 08:30) (16 - 18)  SpO2: 97% (26 Jan 2020 08:30) (96% - 99%)    01-26 @ 07:01  -  01-26 @ 15:14  --------------------------------------------------------  IN: 80 mL / OUT: 0 mL / NET: 80 mL    General: In mild distress, AAO x3  HEENT: No icterus,. Moist mucous membranes  Neck: No JVD noted. Supple, no meningismus, Sounds suggestive of significant secretions in the neck heard.  Cardio: S1, S2 noted, RRR. No murmurs, rubs or gallops  Resp: Fine basilar inspiratory crackles heard, No wheezing, mild crackles in upper lung zones  Abdo: Soft, NT, bowel sounds present. No organomegaly  Extremities: No edema noted. Pulses present b/l  Neuro: AAO x3, Upper extremity strength 3/5, lower extremity motor function. Weak cough.  Skin: Dry, no rashes      LABS:      CBC Full  -  ( 26 Jan 2020 11:31 )  WBC Count : 7.52 K/uL  RBC Count : 3.72 M/uL  Hemoglobin : 11.2 g/dL  Hematocrit : 36.5 %  Platelet Count - Automated : 368 K/uL  Mean Cell Volume : 98.1 fl  Mean Cell Hemoglobin : 30.1 pg  Mean Cell Hemoglobin Concentration : 30.7 gm/dL  Auto Neutrophil # : 5.14 K/uL  Auto Lymphocyte # : 1.45 K/uL  Auto Monocyte # : 0.67 K/uL  Auto Eosinophil # : 0.16 K/uL  Auto Basophil # : 0.04 K/uL  Auto Neutrophil % : 68.4 %  Auto Lymphocyte % : 19.3 %  Auto Monocyte % : 8.9 %  Auto Eosinophil % : 2.1 %  Auto Basophil % : 0.5 %    01-26    144  |  99  |  15  ----------------------------<  114<H>  3.7   |  26  |  0.69    Ca    9.3      26 Jan 2020 11:31  Mg     1.6     01-26    TPro  7.0  /  Alb  3.3  /  TBili  0.4  /  DBili  x   /  AST  21  /  ALT  15  /  AlkPhos  68  01-26    PT/INR - ( 26 Jan 2020 11:31 )   PT: 25.6 sec;   INR: 2.19          PTT - ( 26 Jan 2020 11:31 )  PTT:41.6 sec                  RADIOLOGY & ADDITIONAL STUDIES (The following images were personally reviewed):

## 2020-01-26 NOTE — PROGRESS NOTE ADULT - ASSESSMENT
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 Noted to have worsening weakness of extremities and trouble with secretions.

## 2020-01-26 NOTE — DIETITIAN INITIAL EVALUATION ADULT. - PROBLEM SELECTOR PLAN 4
#COPD    -not on home o2  -CTA chest revealed pulmonary emphysema and pulmonary fibrosis; Probable bronchial mucoid impaction in the right lower lobe. Cannot exclude superimposed pneumonia in the right lower lobe  -afebrile, no leukocytosis, and minimal cough   -no abx at this time, continue to monitor

## 2020-01-26 NOTE — PROGRESS NOTE ADULT - PROBLEM SELECTOR PLAN 2
Typical UIP as described above,   - Failed outpatient perfenidone therapy which can yue seen in 18% of patients  - Will need evaluation for home O2 once acute illness is over

## 2020-01-26 NOTE — SWALLOW BEDSIDE ASSESSMENT ADULT - COMMENTS
Pt reported limited PO intake ~2 weeks prior to admission, w/ dysguesia and intermittent coughing w/ PO. Pt w/ frequent expectoration of mucus (outside of PO intake).    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 CXR- bilateral infiltrates, discoid changes. Per MD, neurologic status declining, likely d/t cord compression from bone mets. Pt receiving supplemental O2 via NC.

## 2020-01-26 NOTE — SWALLOW BEDSIDE ASSESSMENT ADULT - SWALLOW EVAL: DIAGNOSIS
Mild oral deficits, suspect pharyngeal dysphagia given signs of aspiration w/ uncontrolled bolus of thin liquids. Recs for PO diet as tolerated with strict aspiration precautions, given clinical presentation, generalized weakness, and respiratory insufficiency. This SVC will f/u to assess tolerance.

## 2020-01-26 NOTE — PROGRESS NOTE ADULT - PROBLEM SELECTOR PLAN 2
-SBP 180s-190s on [facility]  -continue home coreg and losartan, added amlodipine 5 mg daily to this regimen  -continue to monitor    #Hypotension  -BP to 60s/40s in ER s/p nitro, normalized after IV fluids

## 2020-01-26 NOTE — SWALLOW BEDSIDE ASSESSMENT ADULT - PHARYNGEAL PHASE
Pt w/ cough x1 following large volume of thin liquid, potentially suggestive of aspiration. However, pt w/ cough at rest. Cough resulted in expectoration of mucus.

## 2020-01-26 NOTE — PROGRESS NOTE ADULT - ASSESSMENT
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 within past year without intervention, who presented 1/25 from [facility] with chest pain since the morning, relieved with SL nitro, trop negative x3, admitted for unstable angina. CP since resolved but patient with worsening bilateral hand weakness, currently undergoing nuero evaluation. To be transferred to medicine in am.

## 2020-01-26 NOTE — DIETITIAN INITIAL EVALUATION ADULT. - PROBLEM SELECTOR PLAN 1
-Known Hx CAD s/p stent at [facility] 2 years ago and most recent cath there within 1 year with no intervention.  -Currently without anginal symptoms. Trop neg x2, EKG SR with non-specific ST-T changes  -telemetry monitoring  -A1c WNL, f/u lipid panel  -obtain cath report from [facility]  -continue aspirin 81mg, atorva 80mg, coreg 12.5mg BID, losartan 50mg daily  -possible ischemic eval on Monday

## 2020-01-26 NOTE — PROGRESS NOTE ADULT - PROBLEM SELECTOR PLAN 1
Patient with pulmonary fibrosis and emphysema presents with hypoxic respiratory failure  - CT scan personally reviewed: Significant upper lobe paraseptal emphysema with bullae formation, some centrilobular emphysema, traction bronchiectasis, lower lobe predominant reticulations with bronchioloectasis and honey combing.  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 Low threshold for the same  - Recommend NIF and vital capacity to evaluate the same. If NIF < -30cm of H2O and VC <15-20cc/kg, would recommend transfer to intensive care unit  - Extensive discussions regarding if patient would prefer to be intubated, would continue ongoing discussions for the same as patient would be a poor candidate to wean of ventilator if intubated and reintubation and dependance rate would be high  - Agree with neurology and neurosurgical evaluation for possible cord compression  - Can consider IV decadron in the interim if cleared by neurosurgery  - Can consider duonebs q4-6hrs  - Consider palliative care consult  - O2 as needed, keep sats >88%

## 2020-01-26 NOTE — PROGRESS NOTE ADULT - SUBJECTIVE AND OBJECTIVE BOX
Interventional Cardiology PA Adult Progress Note    C.C.:     S:    ROS negative except per Subjective and HPI.    O:  	  Medications:  atorvastatin 80 milliGRAM(s) Oral at bedtime    apixaban 10 milliGRAM(s) Oral two times a day  ascorbic acid 500 milliGRAM(s) Oral two times a day  dorzolamide 2%/timolol 0.5% Ophthalmic Solution 1 Drop(s) Both EYES two times a day  latanoprost 0.005% Ophthalmic Solution 1 Drop(s) Both EYES at bedtime  multivitamin 1 Tablet(s) Oral daily  prednisoLONE acetate 1% Suspension 1 Drop(s) Both EYES daily    amLODIPine   Tablet 5 milliGRAM(s) Oral daily  carvedilol 12.5 milliGRAM(s) Oral every 12 hours  losartan 50 milliGRAM(s) Oral daily        melatonin 6 milliGRAM(s) Oral at bedtime    polyethylene glycol 3350 17 Gram(s) Oral daily  senna 2 Tablet(s) Oral at bedtime  sucralfate suspension 0.5 Gram(s) Oral four times a day      Vitals:  T(C): 36.4 (01-26-20 @ 09:07), Max: 36.6 (01-25-20 @ 13:32)  HR: 74 (01-26-20 @ 00:41) (73 - 80)  BP: 179/106 (01-26-20 @ 05:57) (156/82 - 193/103)  RR: 17 (01-26-20 @ 05:57) (16 - 18)  SpO2: 96% (01-25-20 @ 21:09) (96% - 99%)  Wt(kg): --  I&O's Summary    Telemetry:     Physical Exam:  Appearance: Normal  HEENT: Normal oral mucosa, EOMi  Neck: Supple, no JVD  Cardiovascular: Normal S1 S2, no murmurs appreciated  Respiratory: Lungs   Gastrointestinal: Soft, non-tender, + BS  Skin: No rashes, ecchymoses, or cyanosis  Extremities: Normal range of motion, no clubbing, cyanosis, or edema  Neurologic:   Psychiatry: A&O x 3, mood and affect appropriate          Labs:	                         11.2   7.52  )-----------( 368      ( 26 Jan 2020 11:31 )             36.5     01-26    144  |  99  |  15  ----------------------------<  114<H>  3.7   |  26  |  0.69    Ca    9.3      26 Jan 2020 11:31  Mg     1.6     01-26    TPro  7.0  /  Alb  3.3  /  TBili  0.4  /  DBili  x   /  AST  21  /  ALT  15  /  AlkPhos  68  01-26        Hemoglobin A1C, Whole Blood: 5.3 % (01-25 @ 18:33)    Thyroid Stimulating Hormone, Serum: 1.349 uIU/mL (01-25 @ 18:33)    PT/INR - ( 26 Jan 2020 11:31 )   PT: 25.6 sec;   INR: 2.19          PTT - ( 26 Jan 2020 11:31 )  PTT:41.6 sec    Radiology: Interventional Cardiology PA Adult Progress Note    C.C.: Arm/hand weakness    S: Chest pain resolved. Patient this morning complaining of weakness of bilateral hands and arms, stating that he was able to use his cell phone 2 days ago but now can barely move his hands. He also complains of shortness of breath and back pain. Patient denies palpitations, shortness of breath, diaphoresis, fatigue, dizziness, syncope, lower extremity edema, orthopnea. States that he has had trouble eating lately.    ROS negative except per Subjective and HPI.    O:  	  Medications:  atorvastatin 80 milliGRAM(s) Oral at bedtime    apixaban 10 milliGRAM(s) Oral two times a day  ascorbic acid 500 milliGRAM(s) Oral two times a day  dorzolamide 2%/timolol 0.5% Ophthalmic Solution 1 Drop(s) Both EYES two times a day  latanoprost 0.005% Ophthalmic Solution 1 Drop(s) Both EYES at bedtime  multivitamin 1 Tablet(s) Oral daily  prednisoLONE acetate 1% Suspension 1 Drop(s) Both EYES daily    amLODIPine   Tablet 5 milliGRAM(s) Oral daily  carvedilol 12.5 milliGRAM(s) Oral every 12 hours  losartan 50 milliGRAM(s) Oral daily    melatonin 6 milliGRAM(s) Oral at bedtime    polyethylene glycol 3350 17 Gram(s) Oral daily  senna 2 Tablet(s) Oral at bedtime  sucralfate suspension 0.5 Gram(s) Oral four times a day      Vitals:  T(C): 36.4 (01-26-20 @ 09:07), Max: 36.6 (01-25-20 @ 13:32)  HR: 74 (01-26-20 @ 00:41) (73 - 80)  BP: 179/106 (01-26-20 @ 05:57) (156/82 - 193/103)  RR: 17 (01-26-20 @ 05:57) (16 - 18)  SpO2: 96% (01-25-20 @ 21:09) (96% - 99%)  Wt(kg): --  I&O's Summary    Telemetry: PVC's Couplets     Physical Exam:  General: In mild distress, AAO x3  HEENT: No icterus,. Moist mucous membranes  Neck: No JVD noted. Supple, no meningismus, Sounds suggestive of significant secretions in the neck heard.  Cardio: S1, S2 noted, RRR. No murmurs, rubs or gallops  Resp: Fine basilar inspiratory crackles heard, No wheezing, mild crackles in upper lung zones  Abdo: Soft, NT, bowel sounds present. No organomegaly  Extremities: No edema noted. Pulses present b/l  Neuro: AAO x3, Upper extremity strength 3/5, lower extremity motor function. Weak cough.  Skin: Dry, no rashes              Labs:	                         11.2   7.52  )-----------( 368      ( 26 Jan 2020 11:31 )             36.5     01-26    144  |  99  |  15  ----------------------------<  114<H>  3.7   |  26  |  0.69    Ca    9.3      26 Jan 2020 11:31  Mg     1.6     01-26    TPro  7.0  /  Alb  3.3  /  TBili  0.4  /  DBili  x   /  AST  21  /  ALT  15  /  AlkPhos  68  01-26        Hemoglobin A1C, Whole Blood: 5.3 % (01-25 @ 18:33)    Thyroid Stimulating Hormone, Serum: 1.349 uIU/mL (01-25 @ 18:33)    PT/INR - ( 26 Jan 2020 11:31 )   PT: 25.6 sec;   INR: 2.19          PTT - ( 26 Jan 2020 11:31 )  PTT:41.6 sec

## 2020-01-26 NOTE — DIETITIAN INITIAL EVALUATION ADULT. - ENERGY NEEDS
Ht (1/25): 175.3cm, Wt (1/26): 55.7kg, IBW: 72.7kg+/-10%, %IBW: 76%, BMI: 18.1   IBW used to calculate energy needs due to pt's current body weight exceeding 120% of IBW. Needs adjusted for underweight status, hypermetabolic state, suspected malnutrition. Fluid needs per team.    >>RD to adjust nutritional needs based on most accurate weights

## 2020-01-26 NOTE — DIETITIAN INITIAL EVALUATION ADULT. - OTHER INFO
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 Pt resting in bed, no apparent pain/distress at this time, pt denies current N/V but states PTA pt would throw up after eating, abdomen soft/nontender, last BM 12 days ago per pt (pt states he takes stool softeners PTA). Jose C score 13. Pt consumed minimal eggs this AM, <25%. Pt reports unable to tolerate PO intake for 20days, reports vomiting after eating PTA, denies food allergies. 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 Pt resting in bed, no apparent pain/distress at this time, pt denies current N/V, abdomen soft/nontender, last BM 12 days ago per pt (pt states he takes stool softeners PTA). Jose C score 13. Pt consumed minimal eggs this AM, <25%. Per PCA, food getting stuck in pt's throat this AM. Per SLP eval 1/26, pt cleared to have mechanical soft diet with thin liquids. Per wife pt with decreased PO intake for ~2weeks PTA mainly due to taste changes associated with chemo tx. Pt has diarrhea with Ensure shakes. Per wife, pt weighed ~147lb within the last month, bed scale weight is 122lb (wife and pt both question accuracy of bed scale weight). Per physical assessment: Muscle Wasting- Temporal [   ]  Clavicle/Pectoral [  X moderate ]  Shoulder/Deltoid [ X moderate  ]  Scapula [ X moderate  ]  Interosseous [   ]  Quadriceps [ X moderate  ]  Gastrocnemius [   ]; Fat Wasting- Orbital [   ]  Buccal [   ]  Triceps [ X severe  ]  Rib [   ]--> Suspect severe malnutrition in the context of acute illness 2/2 to physical assessment, pt meeting <50%EER for >5days, likely significant unintentional weight loss; please see malnutrition chart note. Pt amenable to trial of Orgain protein shakes. Discussed ways to reduce taste changes with pt and wife (chewing on ice/gum/mint, small/frequent meals). Please see below for full nutritional recommendations- d/w team. RD to monitor and f/u per protocol.

## 2020-01-26 NOTE — DIETITIAN INITIAL EVALUATION ADULT. - SIGNS/SYMPTOMS
AEB physical assessment, pt meeting <50%EER for >5days, likely significant unintentional weight loss

## 2020-01-26 NOTE — DIETITIAN INITIAL EVALUATION ADULT. - PROBLEM SELECTOR PLAN 3
-dx at [facility] last week. CTA chest negative for PE here  -continue eliquis 10mg BID until 1/26, transition to 5mg BID on 1/27

## 2020-01-26 NOTE — CONSULT NOTE ADULT - ATTENDING COMMENTS
Patient seen today with stroke NP Tang.   I was physically present for the key portions of the evaluation and management (E/M) service provided.  I agree with the above history, physical, and plan with the following additions/modifications     Agree with history and plan above.  Exam notable for fluctuating weakness with intermittant poor effort, appears to have some true weakness with superimposed poor effort. focus appears to be on acutely worsened left arm weakness since ysterday; much of his appendicular weakness is chronic per history, not clear what small component is acutely worsened exactly.  Mute reflexes throughout including jaw jerk.  Gives up to 4+/5 strength in all extremities and up to 4/5 in hands bilaterally; inconsistent effort on exam other times.  Given his cancer history metastatic disease to the spine is a plausible etiology, superimposed on extensive chronic weakness, however per patient and wife (whom I discussed with by phone) he has been bedbound and highly weak for weeks.      Plan:  -Call the rehab facility from which he was admitted and obtain his Admission neurologic exam/status.  Need neurologic baseline for comparison.  -Can obtain MR C- and T- spine w/wo contrast, however the history suggests the majority of his weakness is subacute, with focal L arm acute worsening.    -obtain OSH records.  ([facility]) - confirm current oncologic status (he and his wife appear to believe he is "in remission" however his CT chest shows extensive bony metastases)  -respiratory decompensation today appears to be a primary pulmonary issue; I am not specifically concerned about neurogenic respiratory weakness given his exam today  -neurology will follow    Walker Mora MD  Attending Neurointensivist Patient seen today with stroke NP Tang.   I was physically present for the key portions of the evaluation and management (E/M) service provided.  I agree with the above history, physical, and plan with the following additions/modifications     Agree with history and plan above.  Exam notable for fluctuating weakness with intermittant poor effort, appears to have some true weakness with superimposed poor effort. focus appears to be on acutely worsened left arm weakness since ysterday; much of his appendicular weakness is chronic per history, not clear what small component is acutely worsened exactly.  Mute reflexes throughout including jaw jerk.  Gives up to 4+/5 strength in all extremities and up to 4/5 in hands bilaterally; inconsistent effort on exam other times.  Given his cancer history metastatic disease to the spine is a plausible etiology, superimposed on extensive chronic weakness, however per patient and wife (whom I discussed with by phone) he has been bedbound and highly weak for weeks.      Recommendations:  ----Please call the rehab facility from which he was admitted and obtain his admission neurologic exam/status from last week.  Need recent neurologic baseline for comparison.  ----Can obtain MR C- and T- spine w/wo contrast, however the history suggests the majority of his weakness is subacute, with focal L arm acute worsening  ----obtain OSH records.  ([facility]) - confirm current oncologic status (he and his wife appear to believe he is "in remission" however his CT chest shows extensive bony metastases)  ----respiratory decompensation today appears to be a primary pulmonary issue; neurologic etiology much less likely  ----neurology will follow    Walker Mora MD  Attending Neurointensivist

## 2020-01-26 NOTE — CHART NOTE - NSCHARTNOTEFT_GEN_A_CORE
Upon Nutritional Assessment by the Registered Dietitian your patient was determined to meet criteria / has evidence of the following diagnosis/diagnoses:          [ ]  Mild Protein Calorie Malnutrition        [ ]  Moderate Protein Calorie Malnutrition        [ X] Severe Protein Calorie Malnutrition        [ ] Unspecified Protein Calorie Malnutrition        [ X] Underweight / BMI <19        [ ] Morbid Obesity / BMI > 40    BMI=18.1  Per physical assessment: Muscle Wasting- Temporal [   ]  Clavicle/Pectoral [  X moderate ]  Shoulder/Deltoid [ X moderate  ]  Scapula [ X moderate  ]  Interosseous [   ]  Quadriceps [ X moderate  ]  Gastrocnemius [   ]  Fat Wasting- Orbital [   ]  Buccal [   ]  Triceps [ X severe  ]  Rib [   ]  Suspect severe malnutrition in the context of acute illness 2/2 to physical assessment, pt meeting <50%EER for >5days, likely significant unintentional weight loss    Findings as based on:  •  Comprehensive nutrition assessment and consultation    Treatment:    The following diet has been recommended:  1. Recommend add Orgain TID   2. Recommend add MVI/minerals  3. Optimize bowel regimen    PROVIDER Section:     By signing this assessment you are acknowledging and agree with the diagnosis/diagnoses assigned by the Registered Dietitian    Comments:

## 2020-01-26 NOTE — SWALLOW BEDSIDE ASSESSMENT ADULT - ASR SWALLOW ASPIRATION MONITOR
cough/fever/oral hygiene/position upright (90Y)/gurgly voice/pneumonia/change of breathing pattern/throat clearing/upper respiratory infection

## 2020-01-26 NOTE — SWALLOW BEDSIDE ASSESSMENT ADULT - ORAL PHASE
Reduced and prolonged bolus formation/manipulation/Decreased anterior-posterior movement of the bolus

## 2020-01-26 NOTE — PROGRESS NOTE ADULT - PROBLEM SELECTOR PLAN 1
Urgent MRI cervical and thoracic spine  Mets in thoracic spine on chest CT   Neurology following Stroke code called and cancelled by neurology as weakness not documented on admission note, per patient has been present since last night and worsening x 2 days  Urgent MRI cervical and thoracic spine  Mets in thoracic spine on chest CT   Neurology following

## 2020-01-26 NOTE — DIETITIAN INITIAL EVALUATION ADULT. - MALNUTRITION
Suspect severe malnutrition in the context of acute illness 2/2 to physical assessment, pt meeting <50%EER for >5days, likely significant unintentional weight loss (suspect severe)

## 2020-01-26 NOTE — CONSULT NOTE ADULT - SUBJECTIVE AND OBJECTIVE BOX
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 He states he was laying in bed when he suddenly experienced non-radiating, non-exertional 6/10 midsternal chest pressure. No associated SOB, dizziness, palpitations, N/V, or diaphoresis. Has never experienced pain like this before, despite having stents placed in past. Resolved only after receiving SL nitro by EMS and since then has not reoccurred. Also reports poor PO intake due to food tasting bad (side effect of chemo)  In ER, patient CP-free, afebrile and initially hemodynamically stable but then became hypotensive to 60s/40s after receiving additional dose of SL nitro, Cards fellow in ED did bedside echo showing preserved EF, received IVF bolus and BP normalized. EKG SR with non-specific ST-T changes. Trop negative x1, BNP 1018. CXR with bilateral infiltrates, CTA PE protocol negative for PE, showing pulmonary emphysema and pulmonary fibrosis, probable bronchial mucoid impaction in the right lower lobe. HE received aspirin 325mg.     Admitted to [facility] for management of unstable angina.   On 1/26 during Cardiology rounds, stroke code was called as patient endorsed bilateral hand weakness to the Cards PA stating that he could not touch the buttons to use his phone. On arrival, it was determined that patient had been having difficulty using his phone since waking 1/25 am and was last able to use his phone normally evening on 1/24. Stroke code was cancelled with plan for neurology consult. Patient and wife state that patient has metastatic prostate cancer with mets to the bone, however they also state that patient is in "remission" from cancer. They endorse that they do not know where his bony mets are. They endorse that patient was last walking with assistance in December prior to [facility]. He was then discharged to [facility] and when physical therapy tried to get him out of bed on Thursday 1/23/2020 he was unable to stand next to the bed. His wife states that patient was basically in bed at the hospital for the past 2 weeks and not eating well. She noticed that he has progressively been getting weaker and weaker throughout, patient agrees but states that the major change he noticed was being unable to use his cell phone yesterday morning. 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 Patient denies acute onset of slurred speech, word finding difficulties, headache, dizziness.     Pt states he last walked in December and feels LBP at the moment and overall weakness since chemo. Pt is poor historian and doesn't want to participate in exam. No urinary incontinence. Admits to constipation x over 1 week.     PAST MEDICAL & SURGICAL HISTORY:  Pulmonary fibrosis  Neoplasm of bone  Hyperlipidemia  Emphysema of lung  Stented coronary artery  H/O hernia repair  S/P cataract surgery    FAMILY HISTORY:  No pertinent family history in first degree relatives      SOCIAL HISTORY:  Tobacco Use:  EtOH use:   Substance:    Allergies    No Known Allergies    Intolerances        REVIEW OF SYSTEMS      General:	no recent illnesses, no recent wt gain/loss    Skin/Breast:  intact  	  Ophthalmologic:  negative, glasses for ***  	  ENMT:	negative    Respiratory and Thorax: no coughing, wheezing, recent URI  	  Cardiovascular: no chest pain, BURTON    Gastrointestinal:	soft, non tender    Genitourinary: no frequency, dysuria    Musculoskeletal:	negative    Neurological:	see HPI    Psychiatric:	negative    Hematology/Lymphatics:	negative    Endocrine:  	negative    Allergic/Immunologic:  Negative      MEDICATIONS:  Antibiotics:    Neuro:  melatonin 6 milliGRAM(s) Oral at bedtime    Anticoagulation:  apixaban 10 milliGRAM(s) Oral two times a day    OTHER:  amLODIPine   Tablet 5 milliGRAM(s) Oral daily  carvedilol 12.5 milliGRAM(s) Oral every 12 hours  dorzolamide 2%/timolol 0.5% Ophthalmic Solution 1 Drop(s) Both EYES two times a day  latanoprost 0.005% Ophthalmic Solution 1 Drop(s) Both EYES at bedtime  losartan 50 milliGRAM(s) Oral daily  polyethylene glycol 3350 17 Gram(s) Oral daily  prednisoLONE acetate 1% Suspension 1 Drop(s) Both EYES daily  senna 2 Tablet(s) Oral at bedtime  sodium chloride 0.65% Nasal 1 Spray(s) Both Nostrils every 4 hours PRN  sucralfate suspension 0.5 Gram(s) Oral four times a day    IVF:  multivitamin 1 Tablet(s) Oral daily      Vital Signs Last 24 Hrs  T(C): 36.3 (26 Jan 2020 14:32), Max: 36.4 (26 Jan 2020 09:07)  T(F): 97.4 (26 Jan 2020 14:32), Max: 97.6 (26 Jan 2020 09:07)  HR: 95 (26 Jan 2020 17:25) (72 - 95)  BP: 151/89 (26 Jan 2020 17:25) (151/89 - 184/101)  BP(mean): --  RR: 18 (26 Jan 2020 17:25) (16 - 18)  SpO2: 96% (26 Jan 2020 17:25) (96% - 97%)    PHYSICAL EXAM:  Pt refuses to participate in full exam  Was able to grab a paper cup and bring to his mouth using Right hand and his left hand  Overall exam inconsistent and generalized weakness with poor effort.  Motor 3-4/5 throughout  No clonus  No hyperreflexia    LABS:                        11.2   7.52  )-----------( 368      ( 26 Jan 2020 11:31 )             36.5     01-26    144  |  99  |  15  ----------------------------<  114<H>  3.7   |  26  |  0.69    Ca    9.3      26 Jan 2020 11:31  Mg     1.6     01-26    TPro  7.0  /  Alb  3.3  /  TBili  0.4  /  DBili  x   /  AST  21  /  ALT  15  /  AlkPhos  68  01-26    PT/INR - ( 26 Jan 2020 11:31 )   PT: 25.6 sec;   INR: 2.19          PTT - ( 26 Jan 2020 11:31 )  PTT:41.6 sec    CULTURES:      RADIOLOGY & ADDITIONAL STUDIES:  No dedicated imaging studies avaialable.  < from: CT Angio Chest PE Protocol w/ IV Cont (01.25.20 @ 10:03) >  Evaluation of the osseous structuresdemonstrates degenerative changes. There are numerous small sclerotic bony lesions, particularly along the thoracic vertebrae and sternum.    < end of copied text >    Assessment: 80 yo male w/bone mets r/o cord compression      Plan:  -agree with neurology recs.   -MRI C/T/L spine w/wo contrast to r/o cord and cauda equina compression given weakness in arms and legs.   -Cannot give surgical opinion until images available  -D/W

## 2020-01-26 NOTE — CONSULT NOTE ADULT - SUBJECTIVE AND OBJECTIVE BOX
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 pain since this morning. He states he was laying in bed when he suddenly experienced non-radiating, non-exertional 6/10 midsternal chest pressure. No associated SOB, dizziness, palpitations, N/V, or diaphoresis. Has never experienced pain like this before, despite having stents placed in past. Resolved only after receiving SL nitro by EMS and since then has not reoccurred. Also reports poor PO intake due to food tasting bad (side effect of chemo)  In ER, patient CP-free, afebrile and initially hemodynamically stable but then became hypotensive to 60s/40s after receiving additional dose of SL nitro, Cards fellow in ED did bedside echo showing preserved EF, received IVF bolus and BP normalized. EKG SR with non-specific ST-T changes. Trop negative x1, BNP 1018. CXR with bilateral infiltrates, CTA PE protocol negative for PE, showing pulmonary emphysema and pulmonary fibrosis, probable bronchial mucoid impaction in the right lower lobe. HE received aspirin 325mg.     Admitted to [facility] for management of unstable angina.    PAST MEDICAL & SURGICAL HISTORY:  Pulmonary fibrosis  Neoplasm of bone  Hyperlipidemia  Emphysema of lung  Stented coronary artery  H/O hernia repair  S/P cataract surgery      FAMILY HISTORY:  No pertinent family history in first degree relatives      SOCIAL HISTORY:  Denies smoking, drinking, or drug use    MEDICATIONS  (STANDING):  amLODIPine   Tablet 5 milliGRAM(s) Oral daily  apixaban 10 milliGRAM(s) Oral two times a day  ascorbic acid 500 milliGRAM(s) Oral two times a day  atorvastatin 80 milliGRAM(s) Oral at bedtime  carvedilol 12.5 milliGRAM(s) Oral every 12 hours  dorzolamide 2%/timolol 0.5% Ophthalmic Solution 1 Drop(s) Both EYES two times a day  latanoprost 0.005% Ophthalmic Solution 1 Drop(s) Both EYES at bedtime  losartan 50 milliGRAM(s) Oral daily  melatonin 6 milliGRAM(s) Oral at bedtime  multivitamin 1 Tablet(s) Oral daily  polyethylene glycol 3350 17 Gram(s) Oral daily  prednisoLONE acetate 1% Suspension 1 Drop(s) Both EYES daily  senna 2 Tablet(s) Oral at bedtime  sucralfate suspension 0.5 Gram(s) Oral four times a day    MEDICATIONS  (PRN):      Allergies    No Known Allergies    Intolerances        Vital Signs Last 24 Hrs  T(C): 36.4 (26 Jan 2020 09:07), Max: 36.6 (25 Jan 2020 13:32)  T(F): 97.6 (26 Jan 2020 09:07), Max: 97.8 (25 Jan 2020 13:32)  HR: 74 (26 Jan 2020 00:41) (73 - 80)  BP: 179/106 (26 Jan 2020 05:57) (156/82 - 193/103)  BP(mean): 106 (25 Jan 2020 15:40) (106 - 106)  RR: 17 (26 Jan 2020 05:57) (16 - 18)  SpO2: 96% (25 Jan 2020 21:09) (96% - 99%)    Physical exam:      NIHSS: ***    LABS:                        11.2   7.52  )-----------( 368      ( 26 Jan 2020 11:31 )             36.5     01-26    144  |  99  |  15  ----------------------------<  114<H>  3.7   |  26  |  0.69    Ca    9.3      26 Jan 2020 11:31  Mg     1.6     01-26    TPro  7.0  /  Alb  3.3  /  TBili  0.4  /  DBili  x   /  AST  21  /  ALT  15  /  AlkPhos  68  01-26    PT/INR - ( 26 Jan 2020 11:31 )   PT: 25.6 sec;   INR: 2.19          PTT - ( 26 Jan 2020 11:31 )  PTT:41.6 sec      RADIOLOGY & ADDITIONAL TESTS:      Assessment and Plan      Nichol Beckwith  Neurology Stroke NP  [phone] Neurology Stroke Consult Note    HPI: 79 year old male former smoker with PMHX HTN, HLD, 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 He states he was laying in bed when he suddenly experienced non-radiating, non-exertional 6/10 midsternal chest pressure. No associated SOB, dizziness, palpitations, N/V, or diaphoresis. Has never experienced pain like this before, despite having stents placed in past. Resolved only after receiving SL nitro by EMS and since then has not reoccurred. Also reports poor PO intake due to food tasting bad (side effect of chemo)  In ER, patient CP-free, afebrile and initially hemodynamically stable but then became hypotensive to 60s/40s after receiving additional dose of SL nitro, Cards fellow in ED did bedside echo showing preserved EF, received IVF bolus and BP normalized. EKG SR with non-specific ST-T changes. Trop negative x1, BNP 1018. CXR with bilateral infiltrates, CTA PE protocol negative for PE, showing pulmonary emphysema and pulmonary fibrosis, probable bronchial mucoid impaction in the right lower lobe. HE received aspirin 325mg.     Admitted to [facility] for management of unstable angina.   On 1/26 during Cardiology rounds, stroke code was called as patient endorsed bilateral hand weakness to the Cards PA stating that he could not touch the buttons to use his phone. On arrival, it was determined that patient had been having difficulty using his phone since waking 1/25 am and was last able to use his phone normally evening on 1/24. Stroke code was cancelled with plan for neurology consult. Patient and wife state that patient has metastatic prostate cancer with mets to the bone, however they also state that patient is in "remission" from cancer. They endorse that they do not know where his bony mets are. They endorse that patient was last walking with assistance in December prior to his [facility]. He was then discharged to [facility] and when physical therapy tried to get him out of bed on Thursday 1/23/2020 he was unable to stand next to the bed. His wife states that patient was basically in bed at the hospital for the past 2 weeks and not eating well. She noticed that he has progressively been getting weaker and weaker throughout, patient agrees but states that the major change he noticed was being unable to use his cell phone yesterday morning. 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 Patient denies acute onset of slurred speech, word finding difficulties, headache, dizziness.     PAST MEDICAL & SURGICAL HISTORY:  Pulmonary fibrosis  Neoplasm of bone  Hyperlipidemia  Emphysema of lung  Stented coronary artery  H/O hernia repair  S/P cataract surgery      FAMILY HISTORY:  No pertinent family history in first degree relatives      SOCIAL HISTORY:  Denies smoking, drinking, or drug use    MEDICATIONS  (STANDING):  amLODIPine   Tablet 5 milliGRAM(s) Oral daily  apixaban 10 milliGRAM(s) Oral two times a day  ascorbic acid 500 milliGRAM(s) Oral two times a day  atorvastatin 80 milliGRAM(s) Oral at bedtime  carvedilol 12.5 milliGRAM(s) Oral every 12 hours  dorzolamide 2%/timolol 0.5% Ophthalmic Solution 1 Drop(s) Both EYES two times a day  latanoprost 0.005% Ophthalmic Solution 1 Drop(s) Both EYES at bedtime  losartan 50 milliGRAM(s) Oral daily  melatonin 6 milliGRAM(s) Oral at bedtime  multivitamin 1 Tablet(s) Oral daily  polyethylene glycol 3350 17 Gram(s) Oral daily  prednisoLONE acetate 1% Suspension 1 Drop(s) Both EYES daily  senna 2 Tablet(s) Oral at bedtime  sucralfate suspension 0.5 Gram(s) Oral four times a day    MEDICATIONS  (PRN):      Allergies    No Known Allergies    Intolerances        Vital Signs Last 24 Hrs  T(C): 36.4 (26 Jan 2020 09:07), Max: 36.6 (25 Jan 2020 13:32)  T(F): 97.6 (26 Jan 2020 09:07), Max: 97.8 (25 Jan 2020 13:32)  HR: 74 (26 Jan 2020 00:41) (73 - 80)  BP: 179/106 (26 Jan 2020 05:57) (156/82 - 193/103)  BP(mean): 106 (25 Jan 2020 15:40) (106 - 106)  RR: 17 (26 Jan 2020 05:57) (16 - 18)  SpO2: 96% (25 Jan 2020 21:09) (96% - 99%)    Physical exam:  Neurologic:  -Mental status: Awake, alert, oriented to person, place, and time. Speech hoarse, patients speaks in brief phrases, coughing and taking deep breathes between speaking. Patient with intact naming, repetition, and comprehension. Follows commands. Slightly decreased attention/concentration. Knows Trump as President  -Cranial nerves:   II: Visual fields are full to confrontation  III, IV, VI: Extraocular movements are intact without nystagmus. Pupils equally round and reactive to light  V:  Facial sensation V1-V3 equal and intact   VII: Trace right facial (at baseline per wife)  Motor: Bilateral pronator drift. Variable effort through strength exam, at max force bilateral hand  is 4/5 while bilateral wrist flexion/extension, bicep/tricep/deltoid is +4/5. Variable effort through strength exam, at max force patient with 4+/5 strength in bilateral hip flexors, knee flexion/extension, dorsi/plantarflexion.   Sensation: Intact to light touch bilaterally. No neglect or extinction on double simultaneous testing.  Coordination: No dysmetria on finger-to-nose  Reflexes: Downgoing toes bilaterally. Mute reflexes: jaw, b/l pectoralis, b/l brachioradialis, b/l patellar   Proprioception: Intact on right hand. Decreased on Left hand 50%. Unable to determine proprioception on toes.     NIHSS: 10    LABS:                        11.2   7.52  )-----------( 368      ( 26 Jan 2020 11:31 )             36.5     01-26    144  |  99  |  15  ----------------------------<  114<H>  3.7   |  26  |  0.69    Ca    9.3      26 Jan 2020 11:31  Mg     1.6     01-26    TPro  7.0  /  Alb  3.3  /  TBili  0.4  /  DBili  x   /  AST  21  /  ALT  15  /  AlkPhos  68  01-26    PT/INR - ( 26 Jan 2020 11:31 )   PT: 25.6 sec;   INR: 2.19          PTT - ( 26 Jan 2020 11:31 )  PTT:41.6 sec      RADIOLOGY & ADDITIONAL TESTS:      Assessment and Plan  79 year old (left handed) former smoker with PMHX HTN, HLD, 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 Stroke code was called on 1/26 for concern of acute bilateral hand weakness by Areli HORTA. Stroke code was cancelled as it was determined that patient's LKW was evening 1/24. Stroke code was cancelled with plan for neurology consult. Patient with previous [facility] hospitalization and progressive weakening decreased PO intake and in bed for past 2 weeks) resulting in recent admission to [facility]. Patient's wife noted he has progressively been getting weaker and weaker throughout, patient agrees but states that the major change he noticed was being unable to use his cell phone yesterday morning. Unclear if patient and patient's wife have a good grasp of patient's diagnosis- wife endorses patient with bony mets but was in remission. Neuro exam with fluctuating weakness and variable effort, patient noted to have better strength when gesticulating or not concentration on exam. Further workup is needed however given bilateral nature of patient's symptoms, likely not stroke or TIA.     - Obtain neurologic baseline from rehab or [facility] hospital for comparison of current symptoms  - Consider obtaining MRI Cervical and Thoracic Spine with/without contrast- however majority of patient's weakness seems subacute with focal L arm worsening  - Obtain OSH records and/or contact patient's Oncologist about status of patient's CA  - No need for further stroke workup at this time  - continue eliquis 10mg BID for DVT/PE   - continue atorvastatin 80mg daily  - inpatient management per Cardiology  - Neurology will continue to follow    Nichol Beckwith  Neurology Stroke NP  [phone]

## 2020-01-27 DIAGNOSIS — Z51.5 ENCOUNTER FOR PALLIATIVE CARE: ICD-10-CM

## 2020-01-27 DIAGNOSIS — R52 PAIN, UNSPECIFIED: ICD-10-CM

## 2020-01-27 DIAGNOSIS — R53.2 FUNCTIONAL QUADRIPLEGIA: ICD-10-CM

## 2020-01-27 DIAGNOSIS — Z71.89 OTHER SPECIFIED COUNSELING: ICD-10-CM

## 2020-01-27 LAB
ANION GAP SERPL CALC-SCNC: 18 MMOL/L — HIGH (ref 5–17)
BUN SERPL-MCNC: 18 MG/DL — SIGNIFICANT CHANGE UP (ref 7–23)
CALCIUM SERPL-MCNC: 9.2 MG/DL — SIGNIFICANT CHANGE UP (ref 8.4–10.5)
CHLORIDE SERPL-SCNC: 100 MMOL/L — SIGNIFICANT CHANGE UP (ref 96–108)
CO2 SERPL-SCNC: 24 MMOL/L — SIGNIFICANT CHANGE UP (ref 22–31)
CREAT SERPL-MCNC: 0.72 MG/DL — SIGNIFICANT CHANGE UP (ref 0.5–1.3)
GLUCOSE SERPL-MCNC: 107 MG/DL — HIGH (ref 70–99)
HCT VFR BLD CALC: 36.2 % — LOW (ref 39–50)
HGB BLD-MCNC: 10.5 G/DL — LOW (ref 13–17)
MAGNESIUM SERPL-MCNC: 2.1 MG/DL — SIGNIFICANT CHANGE UP (ref 1.6–2.6)
MCHC RBC-ENTMCNC: 29 GM/DL — LOW (ref 32–36)
MCHC RBC-ENTMCNC: 29.5 PG — SIGNIFICANT CHANGE UP (ref 27–34)
MCV RBC AUTO: 101.7 FL — HIGH (ref 80–100)
NRBC # BLD: 0 /100 WBCS — SIGNIFICANT CHANGE UP (ref 0–0)
PLATELET # BLD AUTO: 295 K/UL — SIGNIFICANT CHANGE UP (ref 150–400)
POTASSIUM SERPL-MCNC: 3.9 MMOL/L — SIGNIFICANT CHANGE UP (ref 3.5–5.3)
POTASSIUM SERPL-SCNC: 3.9 MMOL/L — SIGNIFICANT CHANGE UP (ref 3.5–5.3)
RBC # BLD: 3.56 M/UL — LOW (ref 4.2–5.8)
RBC # FLD: 14.4 % — SIGNIFICANT CHANGE UP (ref 10.3–14.5)
SODIUM SERPL-SCNC: 142 MMOL/L — SIGNIFICANT CHANGE UP (ref 135–145)
WBC # BLD: 6.39 K/UL — SIGNIFICANT CHANGE UP (ref 3.8–10.5)
WBC # FLD AUTO: 6.39 K/UL — SIGNIFICANT CHANGE UP (ref 3.8–10.5)

## 2020-01-27 PROCEDURE — 99232 SBSQ HOSP IP/OBS MODERATE 35: CPT | Mod: GC

## 2020-01-27 PROCEDURE — 99358 PROLONG SERVICE W/O CONTACT: CPT

## 2020-01-27 PROCEDURE — 99255 IP/OBS CONSLTJ NEW/EST HI 80: CPT

## 2020-01-27 PROCEDURE — 99222 1ST HOSP IP/OBS MODERATE 55: CPT

## 2020-01-27 PROCEDURE — 99233 SBSQ HOSP IP/OBS HIGH 50: CPT

## 2020-01-27 PROCEDURE — 99497 ADVNCD CARE PLAN 30 MIN: CPT

## 2020-01-27 RX ORDER — SODIUM CHLORIDE 9 MG/ML
3 INJECTION INTRAMUSCULAR; INTRAVENOUS; SUBCUTANEOUS THREE TIMES A DAY
Refills: 0 | Status: DISCONTINUED | OUTPATIENT
Start: 2020-01-27 | End: 2020-01-28

## 2020-01-27 RX ORDER — MORPHINE SULFATE 50 MG/1
2 CAPSULE, EXTENDED RELEASE ORAL ONCE
Refills: 0 | Status: DISCONTINUED | OUTPATIENT
Start: 2020-01-27 | End: 2020-01-27

## 2020-01-27 RX ORDER — HYDROMORPHONE HYDROCHLORIDE 2 MG/ML
0.2 INJECTION INTRAMUSCULAR; INTRAVENOUS; SUBCUTANEOUS
Refills: 0 | Status: DISCONTINUED | OUTPATIENT
Start: 2020-01-27 | End: 2020-01-27

## 2020-01-27 RX ORDER — HYDROMORPHONE HYDROCHLORIDE 2 MG/ML
0.5 INJECTION INTRAMUSCULAR; INTRAVENOUS; SUBCUTANEOUS
Refills: 0 | Status: DISCONTINUED | OUTPATIENT
Start: 2020-01-27 | End: 2020-01-28

## 2020-01-27 RX ORDER — MORPHINE SULFATE 50 MG/1
2 CAPSULE, EXTENDED RELEASE ORAL
Refills: 0 | Status: DISCONTINUED | OUTPATIENT
Start: 2020-01-27 | End: 2020-01-27

## 2020-01-27 RX ORDER — HYDROMORPHONE HYDROCHLORIDE 2 MG/ML
0.2 INJECTION INTRAMUSCULAR; INTRAVENOUS; SUBCUTANEOUS EVERY 6 HOURS
Refills: 0 | Status: DISCONTINUED | OUTPATIENT
Start: 2020-01-27 | End: 2020-01-28

## 2020-01-27 RX ADMIN — HYDROMORPHONE HYDROCHLORIDE 0.2 MILLIGRAM(S): 2 INJECTION INTRAMUSCULAR; INTRAVENOUS; SUBCUTANEOUS at 14:33

## 2020-01-27 RX ADMIN — HYDROMORPHONE HYDROCHLORIDE 0.2 MILLIGRAM(S): 2 INJECTION INTRAMUSCULAR; INTRAVENOUS; SUBCUTANEOUS at 12:13

## 2020-01-27 RX ADMIN — Medication 1 SPRAY(S): at 16:38

## 2020-01-27 RX ADMIN — DORZOLAMIDE HYDROCHLORIDE TIMOLOL MALEATE 1 DROP(S): 20; 5 SOLUTION/ DROPS OPHTHALMIC at 18:50

## 2020-01-27 RX ADMIN — HYDROMORPHONE HYDROCHLORIDE 0.2 MILLIGRAM(S): 2 INJECTION INTRAMUSCULAR; INTRAVENOUS; SUBCUTANEOUS at 18:45

## 2020-01-27 RX ADMIN — MORPHINE SULFATE 2 MILLIGRAM(S): 50 CAPSULE, EXTENDED RELEASE ORAL at 10:33

## 2020-01-27 RX ADMIN — HYDROMORPHONE HYDROCHLORIDE 0.2 MILLIGRAM(S): 2 INJECTION INTRAMUSCULAR; INTRAVENOUS; SUBCUTANEOUS at 19:05

## 2020-01-27 RX ADMIN — MORPHINE SULFATE 2 MILLIGRAM(S): 50 CAPSULE, EXTENDED RELEASE ORAL at 10:40

## 2020-01-27 RX ADMIN — HYDROMORPHONE HYDROCHLORIDE 0.5 MILLIGRAM(S): 2 INJECTION INTRAMUSCULAR; INTRAVENOUS; SUBCUTANEOUS at 22:05

## 2020-01-27 RX ADMIN — DORZOLAMIDE HYDROCHLORIDE TIMOLOL MALEATE 1 DROP(S): 20; 5 SOLUTION/ DROPS OPHTHALMIC at 06:38

## 2020-01-27 RX ADMIN — APIXABAN 5 MILLIGRAM(S): 2.5 TABLET, FILM COATED ORAL at 18:47

## 2020-01-27 RX ADMIN — HYDROMORPHONE HYDROCHLORIDE 0.2 MILLIGRAM(S): 2 INJECTION INTRAMUSCULAR; INTRAVENOUS; SUBCUTANEOUS at 12:25

## 2020-01-27 RX ADMIN — HYDROMORPHONE HYDROCHLORIDE 0.5 MILLIGRAM(S): 2 INJECTION INTRAMUSCULAR; INTRAVENOUS; SUBCUTANEOUS at 21:50

## 2020-01-27 RX ADMIN — HYDROMORPHONE HYDROCHLORIDE 0.2 MILLIGRAM(S): 2 INJECTION INTRAMUSCULAR; INTRAVENOUS; SUBCUTANEOUS at 14:45

## 2020-01-27 NOTE — CONSULT NOTE ADULT - PROBLEM SELECTOR RECOMMENDATION 9
Patient had no relief from morphine 2mg IV. Would recommend Dilaudid 0.2mg q6h ATC with Dilaudid 0.2mg q2h IV PRN.

## 2020-01-27 NOTE — PROGRESS NOTE ADULT - ASSESSMENT
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 CP since resolved but patient with worsening bilateral hand weakness, cord compression ruled out. Significant back pain from spinal mets, currently hospice care awaiting full pain management and inpatient hospice bed.

## 2020-01-27 NOTE — PROGRESS NOTE ADULT - PROBLEM SELECTOR PLAN 2
Typical UIP as described above,   - Failed outpatient perfenidone therapy which can yue seen in 18% of patients  - Will need evaluation for home O2 once acute illness is over vs. empiric O2 pending hospice care

## 2020-01-27 NOTE — PROGRESS NOTE ADULT - PROBLEM SELECTOR PLAN 1
Stroke code called and cancelled by neurology as weakness not documented on admission note, per patient has been present since last night and worsening x 2 days  Patient only tolerated non-con portion of MRI: Early subtle metastatic disease is not entirely excluded from cervical spine. Heterogeneous marrow signal intensity, not excluding metastatic disease in thoracic and lumbar spine. Heterogeneous signal of the sacrum and the iliac bones, suggesting metastatic disease. Focal lesions within the L5 suggesting metastatic disease. Mild compression of the T2 superior endplate with Schmorl's node, and a small focal lesion, not excluding metastatic disease. A mild chronic compression of the T6 inferior endplate.  Mets in thoracic spine on chest CT   Neurology following, recommended C spine XR with flexion and extension this morning, tx to be cervical collar if subluxed. Patient would refuse this treatment, does not want further testing, cancelled per patient and Dr. Trujillo.

## 2020-01-27 NOTE — PROGRESS NOTE ADULT - PROBLEM SELECTOR PLAN 2
-SBP 180s-190s on arrival to [facility], 140's-170's today   -continue home coreg and losartan, added amlodipine 5 mg daily to this regimen  -continue to monitor    #Hypotension  -BP to 60s/40s in ER s/p nitro, normalized after IV fluids

## 2020-01-27 NOTE — PROVIDER CONTACT NOTE (OTHER) - SITUATION
Pt coughing, chocking, difficulty breathing and trying to catch his breath after trying to swallow his crushed night meds given with apple sauce.

## 2020-01-27 NOTE — INPATIENT CERTIFICATION FOR MEDICARE PATIENTS - RISKS OF ADVERSE EVENTS
Concern for delay in diagnosis and treatment/Concern for cardiopulmonary deterioration/Concern for neurologic deterioration

## 2020-01-27 NOTE — CONSULT NOTE ADULT - PROBLEM SELECTOR RECOMMENDATION 3
[facility], [facility], last dose 08/2019. Patient stated that he is suffering and would like to just remain comfortable. Appreciate oncology involvement but hospice is what the patient is seeking.

## 2020-01-27 NOTE — PROGRESS NOTE ADULT - PROBLEM SELECTOR PLAN 6
-Known Hx CAD s/p stent at [facility] 2 years ago and most recent cath there within 1 year with no intervention.  -Currently without chest pain, likely was related to sternal mets seen on CT chest   - Trop neg x3, EKG SR with non-specific ST-T changes  -telemetry monitoring  -continue coreg 12.5mg BID, losartan 50mg daily  -patient has decided that he only wants medications for comfort, will d/c atorvastatin and asa
-Known Hx CAD s/p stent at [facility] 2 years ago and most recent cath there within 1 year with no intervention.  -Currently without chest pain, likely was related to sternal mets seen on CT chest   - Trop neg x3, EKG SR with non-specific ST-T changes  -telemetry monitoring  -A1c WNL, f/u lipid panel  -continue coreg 12.5mg BID, losartan 50mg daily  -patient has decided that he only wants medications for comfort, will d/c atorvastatin and asa

## 2020-01-27 NOTE — PROGRESS NOTE ADULT - PROBLEM SELECTOR PLAN 5
-s/p chemo (last dose 08/2019)  -? of chemo-induced neuropathy/guillan barre - no tx at this time as patient is hospice
-s/p chemo (last dose 08/2019)  -chemo-induced neuropathy

## 2020-01-27 NOTE — PROGRESS NOTE ADULT - PROBLEM SELECTOR PLAN 1
Patient with pulmonary fibrosis and emphysema presents with hypoxic respiratory failure  - CT scan personally reviewed: Significant upper lobe paraseptal emphysema with bullae formation, some centrilobular emphysema, traction bronchiectasis, lower lobe predominant reticulations with bronchioloectasis and honey combing.  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 Now DNR/DNI given these concerns and patient's wishes   - would start duonebs q4-6hrs and if patient discharged to hospice, would continue standing nebs for symptomatic tx  - O2 as needed, keep sats >88%

## 2020-01-27 NOTE — PROGRESS NOTE ADULT - SUBJECTIVE AND OBJECTIVE BOX
PULMONARY CONSULT SERVICE FOLLOW-UP NOTE    INTERVAL HPI:  Reviewed chart and overnight events; patient seen and examined at bedside. Says he feels terrible and just wants to go to hospice and die peacefully. Not having SOB per se, but feels overall weak and trouble controlling his saliva.     MEDICATIONS:  Pulmonary:  sodium chloride 3%  Inhalation 3 milliLiter(s) Inhalation three times a day PRN    Antimicrobials:    Anticoagulants:  apixaban 5 milliGRAM(s) Oral every 12 hours    Cardiac:  amLODIPine   Tablet 5 milliGRAM(s) Oral daily  carvedilol 12.5 milliGRAM(s) Oral every 12 hours  losartan 50 milliGRAM(s) Oral daily    Allergies    No Known Allergies    Intolerances    Vital Signs Last 24 Hrs  T(C): 36.4 (27 Jan 2020 05:46), Max: 36.7 (26 Jan 2020 21:53)  T(F): 97.6 (27 Jan 2020 05:46), Max: 98 (26 Jan 2020 21:53)  HR: 99 (27 Jan 2020 12:09) (78 - 103)  BP: 163/94 (27 Jan 2020 12:09) (138/82 - 171/93)  BP(mean): 119 (27 Jan 2020 12:09) (119 - 120)  RR: 20 (27 Jan 2020 12:09) (18 - 20)  SpO2: 96% (27 Jan 2020 12:09) (94% - 98%)    01-26 @ 07:01  -  01-27 @ 07:00  --------------------------------------------------------  IN: 80 mL / OUT: 0 mL / NET: 80 mL    PHYSICAL EXAM:  Constitutional: frail elderly man resting in bed; anxious but not in acute distress  HEENT: MMM  Neck: supple  Cardiovascular: +S1/S2, RRR  Respiratory: diffuse rhonchi throughout w/ bibasilar crackles   Gastrointestinal: soft, NT/ND  Extremities: WWP; no edema, clubbing or cyanosis  Vascular: 2+ radial pulses B/L  Neurological: awake and alert; CUELLO    LABS:  CBC Full  -  ( 27 Jan 2020 06:52 )  WBC Count : 6.39 K/uL  RBC Count : 3.56 M/uL  Hemoglobin : 10.5 g/dL  Hematocrit : 36.2 %  Platelet Count - Automated : 295 K/uL  Mean Cell Volume : 101.7 fl  Mean Cell Hemoglobin : 29.5 pg  Mean Cell Hemoglobin Concentration : 29.0 gm/dL  Auto Neutrophil # : x  Auto Lymphocyte # : x  Auto Monocyte # : x  Auto Eosinophil # : x  Auto Basophil # : x  Auto Neutrophil % : x  Auto Lymphocyte % : x  Auto Monocyte % : x  Auto Eosinophil % : x  Auto Basophil % : x    01-27    142  |  100  |  18  ----------------------------<  107<H>  3.9   |  24  |  0.72    Ca    9.2      27 Jan 2020 06:52  Mg     2.1     01-27    TPro  7.0  /  Alb  3.3  /  TBili  0.4  /  DBili  x   /  AST  21  /  ALT  15  /  AlkPhos  68  01-26    PT/INR - ( 26 Jan 2020 11:31 )   PT: 25.6 sec;   INR: 2.19          PTT - ( 26 Jan 2020 11:31 )  PTT:41.6 sec    RADIOLOGY & ADDITIONAL STUDIES:  Reviewed personally Health Care Proxy (HCP)

## 2020-01-27 NOTE — PROGRESS NOTE ADULT - PROBLEM SELECTOR PLAN 9
#severe malnutrition    Patient unable to swallow whole foods  Speech and swallow evaluated patient, recommended NPO, but as hospice, would like regular diet as a comfort measure, patient aware of aspiration risk      DVT PPx- eliquis  Dispo- inpatient hospice      Case d/w Dr. Trujillo
#severe malnutrition    Patient unable to swallow whole foods  Speech and swallow evaluated patient, recommended mechanical soft diet      DVT PPx- eliquis  Dispo- transfer to medicine in am  -back to [facility] when medically ready    Case d/w Dr. Trujillo

## 2020-01-27 NOTE — PROGRESS NOTE ADULT - PROBLEM SELECTOR PLAN 4
#COPD    -not on home o2  -CTA chest revealed pulmonary emphysema and pulmonary fibrosis; Probable bronchial mucoid impaction in the right lower lobe. Cannot exclude superimposed pneumonia in the right lower lobe  - afebrile, no leukocytosis  - no abx at this time, continue to monitor  - pulm following, concerned about patient not being able to manage secretions, currently using wall suction to help with this  - unable to obtain NIF and vital capacity due to fatigue   - start standing duonebs per pulm recs
#COPD    -not on home o2  -CTA chest revealed pulmonary emphysema and pulmonary fibrosis; Probable bronchial mucoid impaction in the right lower lobe. Cannot exclude superimposed pneumonia in the right lower lobe  -afebrile, no leukocytosis, and minimal cough   -no abx at this time, continue to monitor  -pulm following, concerned about patient not being able to manage secretions, currently using wall suction to help with this  - unable to obtain NIF and vital capacity due to fatigue   - f/u pulm recs

## 2020-01-27 NOTE — PROGRESS NOTE ADULT - ASSESSMENT
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 Noted to have worsening weakness of extremities and trouble with secretions.

## 2020-01-27 NOTE — PROGRESS NOTE ADULT - SUBJECTIVE AND OBJECTIVE BOX
NEUROLOGY CONSULT FOLLOW-UP NOTE    CHIEF COMPLAINT:  CHEST PAIN    INTERVAL HISTORY: Patient complains of worsening weakness in all four extremities, cannot lift limbs off the bed. He is in so much pain from his low back that he feels like he is dying. He wants for us to let him die and wants to go to hospice. Wife is at bedside.    REVIEW OF SYSTEMS:  As per HPI, otherwise negative for Constitutional, Eyes, Ears/Nose/Mouth/Throat, Neck, Cardiovascular, Respiratory, Gastrointestinal, Genitourinary, Skin, Endocrine, Musculoskeletal, Psychiatric, and Hematologic/Lymphatic.    VITAL SIGNS:  Vital Signs Last 24 Hrs  T(C): 36.4 (27 Jan 2020 05:46), Max: 36.7 (26 Jan 2020 21:53)  T(F): 97.6 (27 Jan 2020 05:46), Max: 98 (26 Jan 2020 21:53)  HR: 103 (27 Jan 2020 10:28) (77 - 103)  BP: 152/98 (27 Jan 2020 10:28) (138/82 - 171/93)  BP(mean): 120 (27 Jan 2020 10:28) (120 - 120)  RR: 20 (27 Jan 2020 10:28) (18 - 20)  SpO2: 94% (27 Jan 2020 10:28) (94% - 98%)    PHYSICAL EXAMINATION:  Constitutional: Thin adult  male, jaundiced, chronically ill appearing, dysarthric, in no acute cardiopulmonary distress  Neurologic:  -Mental status: Awake, alert, oriented to person, place, and time. Speech hoarse, patients speaks in brief phrases, coughing and taking deep breathes between speaking. Patient with intact naming, repetition, and comprehension. Follows commands. Slightly decreased attention/concentration. Knows Trump as President  -Cranial nerves:   II: Visual fields are full to confrontation  III, IV, VI: Extraocular movements are intact without nystagmus. Pupils equally round and reactive to light  V:  Facial sensation V1-V3 equal and intact   VII: Trace right facial (at baseline per wife)  Motor: Bilateral pronator drift. Variable effort through strength exam, at max force bilateral hand  is 4/5 while bilateral wrist flexion/extension, bicep/tricep/deltoid is +4/5. Variable effort through strength exam, at max force patient with 4+/5 strength in bilateral hip flexors, knee flexion/extension, dorsi/plantarflexion.   Sensation: Intact to light touch bilaterally. No neglect or extinction on double simultaneous testing.  Coordination: No dysmetria on finger-to-nose  Reflexes: Downgoing toes bilaterally. Mute reflexes: jaw, b/l pectoralis, b/l brachioradialis, b/l patellar   Proprioception: Intact on right hand. Decreased on Left hand 50%. Unable to determine proprioception on toes.       LABS:                        10.5   6.39  )-----------( 295      ( 27 Jan 2020 06:52 )             36.2     01-27    142  |  100  |  18  ----------------------------<  107<H>  3.9   |  24  |  0.72    Ca    9.2      27 Jan 2020 06:52  Mg     2.1     01-27    TPro  7.0  /  Alb  3.3  /  TBili  0.4  /  DBili  x   /  AST  21  /  ALT  15  /  AlkPhos  68  01-26    PT/INR - ( 26 Jan 2020 11:31 )   PT: 25.6 sec;   INR: 2.19          PTT - ( 26 Jan 2020 11:31 )  PTT:41.6 sec      RADIOLOGY & ADDITIONAL STUDIES:  Brain imaging reviewed. MRI whole spine reviewed.    IMPRESSION & RECOMMENDATIONS:

## 2020-01-27 NOTE — PROGRESS NOTE ADULT - PROBLEM SELECTOR PLAN 7
- K 3.0 on admission, received supplementation and improved to 3.3, 3.6 1/27 given additional 40meq. Will stop with lab draws as patient is hospice care.
- K 3.0 on admission, received supplementation and improved to 3.3, 3.6 today  -ordered additional 40meq  -continue to trend

## 2020-01-27 NOTE — PROGRESS NOTE ADULT - SUBJECTIVE AND OBJECTIVE BOX
Interventional Cardiology PA Adult Progress Note    C.C.: Back pain    S: Patient complaining of back pain. States that morphine helped last night but not this morning. Depressed, expresses he is ready to die. Short of breath, but did not tolerate non-rebreather due to claustrophobia.     ROS negative except per Subjective and HPI.    O:  	  Medications:    apixaban 5 milliGRAM(s) Oral every 12 hours  dorzolamide 2%/timolol 0.5% Ophthalmic Solution 1 Drop(s) Both EYES two times a day  latanoprost 0.005% Ophthalmic Solution 1 Drop(s) Both EYES at bedtime  multivitamin 1 Tablet(s) Oral daily  prednisoLONE acetate 1% Suspension 1 Drop(s) Both EYES daily  sodium chloride 0.65% Nasal 1 Spray(s) Both Nostrils every 4 hours PRN    amLODIPine   Tablet 5 milliGRAM(s) Oral daily  carvedilol 12.5 milliGRAM(s) Oral every 12 hours  losartan 50 milliGRAM(s) Oral daily      sodium chloride 3%  Inhalation 3 milliLiter(s) Inhalation three times a day PRN    HYDROmorphone  Injectable 0.2 milliGRAM(s) IV Push every 6 hours  HYDROmorphone  Injectable 0.5 milliGRAM(s) IV Push every 2 hours PRN  melatonin 6 milliGRAM(s) Oral at bedtime    polyethylene glycol 3350 17 Gram(s) Oral daily  senna 2 Tablet(s) Oral at bedtime  sucralfate suspension 0.5 Gram(s) Oral four times a day      Vitals:  T(C): 36.5 (01-27-20 @ 14:20), Max: 36.7 (01-26-20 @ 21:53)  HR: 99 (01-27-20 @ 12:09) (78 - 103)  BP: 163/94 (01-27-20 @ 12:09) (138/82 - 171/93)  RR: 20 (01-27-20 @ 12:09) (18 - 20)  SpO2: 96% (01-27-20 @ 12:09) (94% - 98%)  Wt(kg): --  I&O's Summary    26 Jan 2020 07:01  -  27 Jan 2020 07:00  --------------------------------------------------------  IN: 80 mL / OUT: 0 mL / NET: 80 mL      Telemetry: NSR, occasional PVCs      Physical Exam:  Appearance: Cachectic, mild respiratory distress, using wall suction to manage secretion  HEENT: Normal oral mucosa, EOMi  Neck: Supple, no JVD  Cardiovascular: Normal S1 S2, no murmurs appreciated  Respiratory: Respiratory: diffuse rhonchi throughout w/ bibasilar crackles   Gastrointestinal: Soft, non-tender, + BS  Skin: No rashes, ecchymoses, or cyanosis  Extremities: No pedal edema bilaterally   Neurologic: Bilateral arm weakness, no fine motor function of bilateral hands, has trouble lifting legs off bed, slurred speech, no changes   Psychiatry: A&O x 3, patient is depressed about condition, expresses that he just wants to die     Labs:	 	               10.5   6.39  )-----------( 295      ( 27 Jan 2020 06:52 )             36.2     01-27    142  |  100  |  18  ----------------------------<  107<H>  3.9   |  24  |  0.72    Ca    9.2      27 Jan 2020 06:52  Mg     2.1     01-27    TPro  7.0  /  Alb  3.3  /  TBili  0.4  /  DBili  x   /  AST  21  /  ALT  15  /  AlkPhos  68  01-26      PT/INR - ( 26 Jan 2020 11:31 )   PT: 25.6 sec;   INR: 2.19          PTT - ( 26 Jan 2020 11:31 )  PTT:41.6 sec

## 2020-01-27 NOTE — CONSULT NOTE ADULT - ASSESSMENT
79 year old man with pain, Functional quadriplegia, advance care planing, metastatic prostate cancer and encounter for palliative care

## 2020-01-27 NOTE — GOALS OF CARE CONVERSATION - ADVANCED CARE PLANNING - CONVERSATION DETAILS
In addition to the EM visit, an advance care planning meeting was performed  Start time:1030  End time:11am  Total time:30  A face to face meeting to discuss advance care planning was held today regarding: VALERIO FUENTES  Primary decision maker:   Alternate/surrogate:  Discussed advance directives including, but not limited to, healthcare proxy and code status.  Decision regarding code status:DNR/DNI  Documentation completed today: MOLST and HCP

## 2020-01-27 NOTE — CONSULT NOTE ADULT - SUBJECTIVE AND OBJECTIVE BOX
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 He states he was laying in bed when he suddenly experienced non-radiating, non-exertional 6/10 midsternal chest pressure. No associated SOB, dizziness, palpitations, N/V, or diaphoresis. Has never experienced pain like this before, despite having stents placed in past. Resolved only after receiving SL nitro by EMS and since then has not reoccurred. Also reports poor PO intake due to food tasting bad (side effect of chemo)  In ER, patient CP-free, afebrile and initially hemodynamically stable but then became hypotensive to 60s/40s after receiving additional dose of SL nitro, Cards fellow in ED did bedside echo showing preserved EF, received IVF bolus and BP normalized. EKG SR with non-specific ST-T changes. Trop negative x1, BNP 1018. CXR with bilateral infiltrates, CTA PE protocol negative for PE, showing pulmonary emphysema and pulmonary fibrosis, probable bronchial mucoid impaction in the right lower lobe. HE received aspirin 325mg.     Admitted to [facility] for management of unstable angina. (25 Jan 2020 15:40)    SUBJECTIVE:    ROS:  DYSPNEA: Y / N	  NAUS/VOM: Y / N	  SECRETIONS: Y / N	  AGITATION: Y / N  Pain (Y/N):       -Provocation/Palliation:  -Quality/Quantity:  -Radiating:  -Severity:  -Timing/Frequency:  -Impact on ADLs:    OTHER REVIEW OF SYSTEMS:  UNABLE TO OBTAIN  due to:    PEx:  T(C): 36.4 (01-27-20 @ 05:46), Max: 36.7 (01-26-20 @ 21:53)  HR: 84 (01-27-20 @ 06:06) (77 - 95)  BP: 149/88 (01-27-20 @ 06:06) (138/82 - 167/89)  RR: 18 (01-27-20 @ 06:06) (18 - 18)  SpO2: 98% (01-27-20 @ 06:06) (96% - 98%)  Wt(kg): --    GENERAL:  [ ]Alert  [ ]Oriented x   [ ]Lethargic due to sedation  [ ]Cachexia [ ]Verbal  [ ]Non-Verbal  Behavioral:   [ ] Anxiety  [ ] Delirium [ ] Agitation [ ] Other  HEENT:  [ ]Normal   [ ]Dry mouth   [ ]ET Tube  [ ]Oral lesions  PULMONARY:   [ ]Clear  [ ]Tachypnea  [ ]Audible excessive secretions   [ ]Rhonchi     [ ]Right [ ]Left [ ]Bilateral  [ ]Crackles        [ ]Right [ ]Left [ ]Bilateral  [ ]Wheezing     [ ]Right [ ]Left [ ]Bilateral  CARDIOVASCULAR:    [ ]Regular [ ]Irregular [ ]Tachy  [ ]Aaron [ ]Murmur [ ]Other  GASTROINTESTINAL:  [ ]Soft  [ ]Distended   [ ]+BS  [ ]Non tender [ ]Tender  [ ]PEG [ ]OGT/NGT  [] flexiseal with output  Last BM:   GENITOURINARY:  [ ]Normal [ ] Incontinent   [ ]Oliguria/Anuria   [ ]Esparza  MUSCULOSKELETAL:   [ ]Normal   [ ]Weakness  [ ]Bed/Wheelchair bound [ ]Edema  NEUROLOGIC:   [ ]No focal deficits  [ ] Cognitive impairment  [ ] Dysphagia [ ]Dysarthria [ ] Paresis [  ]Other   SKIN:   [ ]Normal   [ ]Pressure ulcer(s)  [ ]Rash       ALLERGIES: No Known Allergies      OPIATE NAÏVE (Y/N): Y    MEDICATIONS: REVIEWED  MEDICATIONS  (STANDING):  amLODIPine   Tablet 5 milliGRAM(s) Oral daily  apixaban 5 milliGRAM(s) Oral every 12 hours  carvedilol 12.5 milliGRAM(s) Oral every 12 hours  dorzolamide 2%/timolol 0.5% Ophthalmic Solution 1 Drop(s) Both EYES two times a day  latanoprost 0.005% Ophthalmic Solution 1 Drop(s) Both EYES at bedtime  losartan 50 milliGRAM(s) Oral daily  melatonin 6 milliGRAM(s) Oral at bedtime  multivitamin 1 Tablet(s) Oral daily  polyethylene glycol 3350 17 Gram(s) Oral daily  prednisoLONE acetate 1% Suspension 1 Drop(s) Both EYES daily  senna 2 Tablet(s) Oral at bedtime  sucralfate suspension 0.5 Gram(s) Oral four times a day    MEDICATIONS  (PRN):  sodium chloride 0.65% Nasal 1 Spray(s) Both Nostrils every 4 hours PRN Nasal Congestion  sodium chloride 3%  Inhalation 3 milliLiter(s) Inhalation three times a day PRN softening mucus      LABS: REVIEWED  CBC:                        10.5   6.39  )-----------( 295      ( 27 Jan 2020 06:52 )             36.2     CMP:    01-27    142  |  100  |  18  ----------------------------<  107<H>  3.9   |  24  |  0.72    Ca    9.2      27 Jan 2020 06:52  Mg     2.1     01-27    TPro  7.0  /  Alb  3.3  /  TBili  0.4  /  DBili  x   /  AST  21  /  ALT  15  /  AlkPhos  68  01-26      IMAGING:   < from: CT Angio Chest PE Protocol w/ IV Cont (01.25.20 @ 10:03) >  EXAM:  CT ANGIO CHEST PE PROTOCOL IC                          PROCEDURE DATE:  01/25/2020    IMPRESSION:     No pulmonary embolism.    Pulmonary emphysema and pulmonary fibrosis.    Probable bronchial mucoid impaction in the right lower lobe. Cannot exclude superimposed pneumonia in the right lower lobe.    Evidence of bony metastasis.    < end of copied text >        ADVANCED DIRECTIVES:            FULL CODE            DECISION MAKER: Spouse  LEGAL SURROGATE:  Aliza Giron [phone].     PSYCHOSOCIAL-SPIRITUAL ASSESSMENT:       Reviewed       Care plan unchanged       Care plan adjusted as above	    GOALS OF CARE DISCUSSION       Palliative care info/counseling provided	           Family meeting  < from: MR Lumbar Spine No Cont (01.26.20 @ 23:22) >  EXAM:  MR SPINE LUMBAR                          PROCEDURE DATE:  01/26/2020    ******PRELIMINARY REPORT******    ******PRELIMINARY REPORT******        IMPRESSION:  1. Heterogeneous marrow signal intensity, background ofosteopenia, marrow replacement process,  not excluding metastatic disease.  2. A focal lesion within the L5 with STIR signal, suggesting metastatic disease.  3. Left renal cortical cyst is present.  4. Heterogeneous signal of the sacrum and the iliacbones, suggesting metastatic disease.  Thank you for allowing us to participate in the care of your patient.    < end of copied text >    < from: MR Cervical Spine No Cont (01.26.20 @ 23:21) >    EXAM:  MR SPINE CERVICAL                          PROCEDURE DATE:  01/26/2020      IMPRESSION:  1. There is no focal osseous lesion of the cervical spine with STIR signal, but early subtle metastatic disease is not entirely excluded given the heterogeneous marrow signal.  2. There is mild compression of the T2 superior endplate, there is superior endplate Schmorl's node, and a small focal lesion, not excluding metastatic disease.    < end of copied text >    < from: MR Thoracic Spine No Cont (01.26.20 @ 23:21) >    EXAM:  MR SPINE THORACIC                         IMPRESSION:  1. A heterogeneous marrow signal intensity, marrow replacement process, not excluding metastatic  disease.  2. A mild chronic compression of the T6 inferior endplate.  3. There is mild compression of the T2 superior endplate, there is superior endplate Schmorl's node,  and a small focal lesion, not excluding metastatic disease.    < end of copied text >         Advanced Directives addressed please see Advance Care Planning Note	           See previous Palliative Medicine Note       Documentation of GOC:   	      AGENCY CHOICE DISCUSSED:           Homecare        Hospice        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 He states he was laying in bed when he suddenly experienced non-radiating, non-exertional 6/10 midsternal chest pressure. No associated SOB, dizziness, palpitations, N/V, or diaphoresis. Has never experienced pain like this before, despite having stents placed in past. Resolved only after receiving SL nitro by EMS and since then has not reoccurred. Also reports poor PO intake due to food tasting bad (side effect of chemo)  In ER, patient CP-free, afebrile and initially hemodynamically stable but then became hypotensive to 60s/40s after receiving additional dose of SL nitro, Cards fellow in ED did bedside echo showing preserved EF, received IVF bolus and BP normalized. EKG SR with non-specific ST-T changes. Trop negative x1, BNP 1018. CXR with bilateral infiltrates, CTA PE protocol negative for PE, showing pulmonary emphysema and pulmonary fibrosis, probable bronchial mucoid impaction in the right lower lobe. HE received aspirin 325mg.     Admitted to [facility] for management of unstable angina. (25 Jan 2020 15:40)    SUBJECTIVE:     ROS:  DYSPNEA: N  NAUS/VOM: N	  SECRETIONS:N	  AGITATION: N  Pain (Y/N):      Y  -Provocation/Palliation: pain is constant and worse with movement   -Quality/Quantity: Aching and throbbing   -Radiating: non  -Severity: 10/10  -Timing/Frequency: Constant   -Impact on ADLs: patient is unable to do any adls    OTHER REVIEW OF SYSTEMS: WNL  UNABLE TO OBTAIN  due to: n/a    PEx:  T(C): 36.4 (01-27-20 @ 05:46), Max: 36.7 (01-26-20 @ 21:53)  HR: 84 (01-27-20 @ 06:06) (77 - 95)  BP: 149/88 (01-27-20 @ 06:06) (138/82 - 167/89)  RR: 18 (01-27-20 @ 06:06) (18 - 18)  SpO2: 98% (01-27-20 @ 06:06) (96% - 98%)  Wt(kg): --    GENERAL:  [x ]Alert  [x ]Oriented x  3 [ ]Lethargic due to sedation  [ ]Cachexia [ ]Verbal  [ ]Non-Verbal  Behavioral:   [ ] Anxiety  [ ] Delirium [ ] Agitation [x ] Other  HEENT:  [ ]Normal   [x ]Dry mouth   [ ]ET Tube  [ ]Oral lesions  PULMONARY:   [ ]Clear  [ ]Tachypnea  [ x]Audible excessive secretions   [ ]Rhonchi     [ ]Right [ ]Left [ ]Bilateral  [ ]Crackles        [ ]Right [ ]Left [ ]Bilateral  [ ]Wheezing     [ ]Right [ ]Left [ ]Bilateral  CARDIOVASCULAR:    [x ]Regular [ ]Irregular [ ]Tachy  [ ]Aaron [ ]Murmur [ ]Other  GASTROINTESTINAL:  [x ]Soft  [ ]Distended   [ ]+BS  [x ]Non tender [ ]Tender  [ ]PEG [ ]OGT/NGT  [] flexiseal with output  Last BM: 1/25/2020  GENITOURINARY:  [ ]Normal [x ] Incontinent   [ ]Oliguria/Anuria   [ ]Esparza  MUSCULOSKELETAL:   [ ]Normal   [ ]Weakness  [x ]Bed/Wheelchair bound [ ]Edema  NEUROLOGIC:   [ x]No focal deficits  [ ] Cognitive impairment  [ ] Dysphagia [ ]Dysarthria [ ] Paresis [  ]Other   SKIN:   [x ]Normal   [ ]Pressure ulcer(s)  [ ]Rash       ALLERGIES: No Known Allergies      OPIATE NAÏVE (Y/N): Y    MEDICATIONS: REVIEWED  MEDICATIONS  (STANDING):  amLODIPine   Tablet 5 milliGRAM(s) Oral daily  apixaban 5 milliGRAM(s) Oral every 12 hours  carvedilol 12.5 milliGRAM(s) Oral every 12 hours  dorzolamide 2%/timolol 0.5% Ophthalmic Solution 1 Drop(s) Both EYES two times a day  latanoprost 0.005% Ophthalmic Solution 1 Drop(s) Both EYES at bedtime  losartan 50 milliGRAM(s) Oral daily  melatonin 6 milliGRAM(s) Oral at bedtime  multivitamin 1 Tablet(s) Oral daily  polyethylene glycol 3350 17 Gram(s) Oral daily  prednisoLONE acetate 1% Suspension 1 Drop(s) Both EYES daily  senna 2 Tablet(s) Oral at bedtime  sucralfate suspension 0.5 Gram(s) Oral four times a day    MEDICATIONS  (PRN):  sodium chloride 0.65% Nasal 1 Spray(s) Both Nostrils every 4 hours PRN Nasal Congestion  sodium chloride 3%  Inhalation 3 milliLiter(s) Inhalation three times a day PRN softening mucus      LABS: REVIEWED  CBC:                        10.5   6.39  )-----------( 295      ( 27 Jan 2020 06:52 )             36.2     CMP:    01-27    142  |  100  |  18  ----------------------------<  107<H>  3.9   |  24  |  0.72    Ca    9.2      27 Jan 2020 06:52  Mg     2.1     01-27    TPro  7.0  /  Alb  3.3  /  TBili  0.4  /  DBili  x   /  AST  21  /  ALT  15  /  AlkPhos  68  01-26      IMAGING:   < from: CT Angio Chest PE Protocol w/ IV Cont (01.25.20 @ 10:03) >  EXAM:  CT ANGIO CHEST PE PROTOCOL IC                          PROCEDURE DATE:  01/25/2020    IMPRESSION:     No pulmonary embolism.    Pulmonary emphysema and pulmonary fibrosis.    Probable bronchial mucoid impaction in the right lower lobe. Cannot exclude superimposed pneumonia in the right lower lobe.    Evidence of bony metastasis.    < end of copied text >    < from: MR Lumbar Spine No Cont (01.26.20 @ 23:22) >  EXAM:  MR SPINE LUMBAR                          PROCEDURE DATE:  01/26/2020    ******PRELIMINARY REPORT******    ******PRELIMINARY REPORT******        IMPRESSION:  1. Heterogeneous marrow signal intensity, background ofosteopenia, marrow replacement process,  not excluding metastatic disease.  2. A focal lesion within the L5 with STIR signal, suggesting metastatic disease.  3. Left renal cortical cyst is present.  4. Heterogeneous signal of the sacrum and the iliacbones, suggesting metastatic disease.  Thank you for allowing us to participate in the care of your patient.    < end of copied text >    < from: MR Cervical Spine No Cont (01.26.20 @ 23:21) >    EXAM:  MR SPINE CERVICAL                          PROCEDURE DATE:  01/26/2020      IMPRESSION:  1. There is no focal osseous lesion of the cervical spine with STIR signal, but early subtle metastatic disease is not entirely excluded given the heterogeneous marrow signal.  2. There is mild compression of the T2 superior endplate, there is superior endplate Schmorl's node, and a small focal lesion, not excluding metastatic disease.    < end of copied text >    < from: MR Thoracic Spine No Cont (01.26.20 @ 23:21) >    EXAM:  MR SPINE THORACIC                         IMPRESSION:  1. A heterogeneous marrow signal intensity, marrow replacement process, not excluding metastatic  disease.  2. A mild chronic compression of the T6 inferior endplate.  3. There is mild compression of the T2 superior endplate, there is superior endplate Schmorl's node,  and a small focal lesion, not excluding metastatic disease.    < end of copied text >    ADVANCED DIRECTIVES:            FULL CODE            DECISION MAKER: Spouse  LEGAL SURROGATE:  Aliza Miguelpherd [phone].     PSYCHOSOCIAL-SPIRITUAL ASSESSMENT:       Reviewed       Care plan unchanged       Care plan adjusted as above	    GOALS OF CARE DISCUSSION       Palliative care info/counseling provided	           Family meeting      Advanced Directives addressed please see Advance Care Planning Note	           See previous Palliative Medicine Note       Documentation of GOC: DNR/DNIO  	      AGENCY CHOICE DISCUSSED:                   [facility]       REFERRALS	        Palliative Med        Music Therapy       Pet Therapy       Hospice

## 2020-01-27 NOTE — PROGRESS NOTE ADULT - ASSESSMENT
- Xray C spine flexion and extension, premedicate for pain  - pain control with morphine, assess patient history as he may not be opiate-naive and is having bone pain, in addition to neuropathic pain  - discussed with palliative, they will make pain control recommendations in today's notes  - B12, folate, CPK, aldolase, copper level in AM  - neurosurgery PA note noted from yesterday, ensure NSGY aware that this is weakness with sensory deficit at or about the level of C4, not currently explained by findings on MRI spine    Neurology service will continue to follow.

## 2020-01-27 NOTE — CONSULT NOTE ADULT - PROBLEM SELECTOR RECOMMENDATION 5
Discussion had with patient and his wife. The patient stated that he did not want to suffer and wanted to be pain free. A MOLST was reviewed with them, and the patient stated that he wanted to be a DNR/DNI/trial of feeding tube/ a trial of IV fluids/ Use antibiotics/ Send to hospital as needed and limited medical interventions. MOLST filled out with family and placed in chart.   Placement was also discussed and it was agreed that [facility] was the best place for him. Discussion had with patient and his wife. The patient stated that he did not want to suffer and wanted to be pain free. A MOLST was reviewed with them, and the patient stated that he wanted to be a DNR/DNI/trial of feeding tube/ a trial of IV fluids/ Use antibiotics/ Send to hospital as needed and limited medical interventions. MOLST filled out with family and placed in chart.   Placement was also discussed and it was agreed that [facility] was the best place for him.  As discussed during the palliative IDT meeting and as identified during the patients PSSA screening the patient would benefit from: hospice

## 2020-01-27 NOTE — PROGRESS NOTE ADULT - PROBLEM SELECTOR PLAN 8
Patient has decided to be DNR/DNI and only wants comfort/hospice measures  Palliative care recommend Dilaudid 0.2mg q6h ATC with Dilaudid 0.2mg q2h IV PRN.  Palliative care following  DISPO to inpatient hospice
Patient has decided to be DNR/DNI and only wants comfort measures  Palliative care consult in am  Morphine 2 mg IV x 1 for back pain, likely from mets, can transition to PO after pain well controlled

## 2020-01-27 NOTE — CHART NOTE - NSCHARTNOTEFT_GEN_A_CORE
PALLIATIVE MEDICINE COORDINATION OF CARE NOTE FOR VALERIO FUENTES  [  ] ED trigger    [  ] MICU trigger    [  ] Consult    Patient will be seen as full consult within 24h. 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 He states he was laying in bed when he suddenly experienced non-radiating, non-exertional 6/10 midsternal chest pressure. No associated SOB, dizziness, palpitations, N/V, or diaphoresis. Has never experienced pain like this before, despite having stents placed in past. Resolved only after receiving SL nitro by EMS and since then has not reoccurred. Also reports poor PO intake due to food tasting bad (side effect of chemo)  In ER, patient CP-free, afebrile and initially hemodynamically stable but then became hypotensive to 60s/40s after receiving additional dose of SL nitro, Cards fellow in ED did bedside echo showing preserved EF, received IVF bolus and BP normalized. EKG SR with non-specific ST-T changes. Trop negative x1, BNP 1018. CXR with bilateral infiltrates, CTA PE protocol negative for PE, showing pulmonary emphysema and pulmonary fibrosis, probable bronchial mucoid impaction in the right lower lobe. HE received aspirin 325mg.     Admitted to [facility] for management of unstable angina. (25 Jan 2020 15:40)      - Other: iStop reviewed.   Ref #:  [id number]  01/06/2020	01/07/2020	zolpidem tartrate 10 mg tablet	30	30	Donald Gregory MD  12/26/2019	12/26/2019	tramadol hcl 50 mg tablet	9	3	[facility]    - Other: Medication reviewed.  received morphine 2mg IV one time dose     MEDICATIONS  (STANDING):  amLODIPine   Tablet 5 milliGRAM(s) Oral daily  apixaban 5 milliGRAM(s) Oral every 12 hours  carvedilol 12.5 milliGRAM(s) Oral every 12 hours  dorzolamide 2%/timolol 0.5% Ophthalmic Solution 1 Drop(s) Both EYES two times a day  latanoprost 0.005% Ophthalmic Solution 1 Drop(s) Both EYES at bedtime  losartan 50 milliGRAM(s) Oral daily  melatonin 6 milliGRAM(s) Oral at bedtime  multivitamin 1 Tablet(s) Oral daily  polyethylene glycol 3350 17 Gram(s) Oral daily  prednisoLONE acetate 1% Suspension 1 Drop(s) Both EYES daily  senna 2 Tablet(s) Oral at bedtime  sucralfate suspension 0.5 Gram(s) Oral four times a day    MEDICATIONS  (PRN):  sodium chloride 0.65% Nasal 1 Spray(s) Both Nostrils every 4 hours PRN Nasal Congestion  sodium chloride 3%  Inhalation 3 milliLiter(s) Inhalation three times a day PRN softening mucus      - Other: Advanced directives    No advance directives in patients chart. Listed as his surrogate is his spouse Aliza Fuentes [phone].        - Other: Coordination/Plan of care     Discussed with primary team

## 2020-01-28 VITALS
OXYGEN SATURATION: 98 % | HEART RATE: 111 BPM | RESPIRATION RATE: 16 BRPM | SYSTOLIC BLOOD PRESSURE: 150 MMHG | DIASTOLIC BLOOD PRESSURE: 91 MMHG

## 2020-01-28 PROCEDURE — 93005 ELECTROCARDIOGRAM TRACING: CPT

## 2020-01-28 PROCEDURE — 71275 CT ANGIOGRAPHY CHEST: CPT

## 2020-01-28 PROCEDURE — 85610 PROTHROMBIN TIME: CPT

## 2020-01-28 PROCEDURE — 92526 ORAL FUNCTION THERAPY: CPT

## 2020-01-28 PROCEDURE — 36415 COLL VENOUS BLD VENIPUNCTURE: CPT

## 2020-01-28 PROCEDURE — 99291 CRITICAL CARE FIRST HOUR: CPT | Mod: 25

## 2020-01-28 PROCEDURE — 99232 SBSQ HOSP IP/OBS MODERATE 35: CPT

## 2020-01-28 PROCEDURE — 99358 PROLONG SERVICE W/O CONTACT: CPT

## 2020-01-28 PROCEDURE — 85027 COMPLETE CBC AUTOMATED: CPT

## 2020-01-28 PROCEDURE — 80053 COMPREHEN METABOLIC PANEL: CPT

## 2020-01-28 PROCEDURE — 92610 EVALUATE SWALLOWING FUNCTION: CPT

## 2020-01-28 PROCEDURE — 84443 ASSAY THYROID STIM HORMONE: CPT

## 2020-01-28 PROCEDURE — 83880 ASSAY OF NATRIURETIC PEPTIDE: CPT

## 2020-01-28 PROCEDURE — 85025 COMPLETE CBC W/AUTO DIFF WBC: CPT

## 2020-01-28 PROCEDURE — 72148 MRI LUMBAR SPINE W/O DYE: CPT

## 2020-01-28 PROCEDURE — 80061 LIPID PANEL: CPT

## 2020-01-28 PROCEDURE — 85730 THROMBOPLASTIN TIME PARTIAL: CPT

## 2020-01-28 PROCEDURE — 84484 ASSAY OF TROPONIN QUANT: CPT

## 2020-01-28 PROCEDURE — 72146 MRI CHEST SPINE W/O DYE: CPT

## 2020-01-28 PROCEDURE — 83036 HEMOGLOBIN GLYCOSYLATED A1C: CPT

## 2020-01-28 PROCEDURE — 82553 CREATINE MB FRACTION: CPT

## 2020-01-28 PROCEDURE — 72141 MRI NECK SPINE W/O DYE: CPT

## 2020-01-28 PROCEDURE — 80048 BASIC METABOLIC PNL TOTAL CA: CPT

## 2020-01-28 PROCEDURE — 94640 AIRWAY INHALATION TREATMENT: CPT

## 2020-01-28 PROCEDURE — 99238 HOSP IP/OBS DSCHRG MGMT 30/<: CPT

## 2020-01-28 PROCEDURE — 71045 X-RAY EXAM CHEST 1 VIEW: CPT

## 2020-01-28 PROCEDURE — 83735 ASSAY OF MAGNESIUM: CPT

## 2020-01-28 PROCEDURE — 82962 GLUCOSE BLOOD TEST: CPT

## 2020-01-28 PROCEDURE — 83690 ASSAY OF LIPASE: CPT

## 2020-01-28 PROCEDURE — 82550 ASSAY OF CK (CPK): CPT

## 2020-01-28 RX ORDER — HYDROMORPHONE HYDROCHLORIDE 2 MG/ML
0.5 INJECTION INTRAMUSCULAR; INTRAVENOUS; SUBCUTANEOUS
Qty: 0 | Refills: 0 | DISCHARGE
Start: 2020-01-28

## 2020-01-28 RX ORDER — HYDROMORPHONE HYDROCHLORIDE 2 MG/ML
0.2 INJECTION INTRAMUSCULAR; INTRAVENOUS; SUBCUTANEOUS
Qty: 0 | Refills: 0 | DISCHARGE
Start: 2020-01-28

## 2020-01-28 RX ORDER — ATORVASTATIN CALCIUM 80 MG/1
1 TABLET, FILM COATED ORAL
Qty: 0 | Refills: 0 | DISCHARGE

## 2020-01-28 RX ORDER — ASPIRIN/CALCIUM CARB/MAGNESIUM 324 MG
1 TABLET ORAL
Qty: 0 | Refills: 0 | DISCHARGE

## 2020-01-28 RX ORDER — AMLODIPINE BESYLATE 2.5 MG/1
1 TABLET ORAL
Qty: 0 | Refills: 0 | DISCHARGE
Start: 2020-01-28

## 2020-01-28 RX ORDER — APIXABAN 2.5 MG/1
1 TABLET, FILM COATED ORAL
Qty: 0 | Refills: 0 | DISCHARGE
Start: 2020-01-28

## 2020-01-28 RX ORDER — APIXABAN 2.5 MG/1
2 TABLET, FILM COATED ORAL
Qty: 0 | Refills: 0 | DISCHARGE
End: 2020-01-26

## 2020-01-28 RX ADMIN — HYDROMORPHONE HYDROCHLORIDE 0.2 MILLIGRAM(S): 2 INJECTION INTRAMUSCULAR; INTRAVENOUS; SUBCUTANEOUS at 06:02

## 2020-01-28 RX ADMIN — HYDROMORPHONE HYDROCHLORIDE 0.2 MILLIGRAM(S): 2 INJECTION INTRAMUSCULAR; INTRAVENOUS; SUBCUTANEOUS at 01:02

## 2020-01-28 RX ADMIN — Medication 1 SPRAY(S): at 14:43

## 2020-01-28 RX ADMIN — HYDROMORPHONE HYDROCHLORIDE 0.5 MILLIGRAM(S): 2 INJECTION INTRAMUSCULAR; INTRAVENOUS; SUBCUTANEOUS at 03:50

## 2020-01-28 RX ADMIN — SODIUM CHLORIDE 3 MILLILITER(S): 9 INJECTION INTRAMUSCULAR; INTRAVENOUS; SUBCUTANEOUS at 06:01

## 2020-01-28 RX ADMIN — SODIUM CHLORIDE 3 MILLILITER(S): 9 INJECTION INTRAMUSCULAR; INTRAVENOUS; SUBCUTANEOUS at 14:50

## 2020-01-28 RX ADMIN — HYDROMORPHONE HYDROCHLORIDE 0.2 MILLIGRAM(S): 2 INJECTION INTRAMUSCULAR; INTRAVENOUS; SUBCUTANEOUS at 14:47

## 2020-01-28 RX ADMIN — HYDROMORPHONE HYDROCHLORIDE 0.2 MILLIGRAM(S): 2 INJECTION INTRAMUSCULAR; INTRAVENOUS; SUBCUTANEOUS at 11:57

## 2020-01-28 RX ADMIN — HYDROMORPHONE HYDROCHLORIDE 0.5 MILLIGRAM(S): 2 INJECTION INTRAMUSCULAR; INTRAVENOUS; SUBCUTANEOUS at 14:47

## 2020-01-28 RX ADMIN — HYDROMORPHONE HYDROCHLORIDE 0.2 MILLIGRAM(S): 2 INJECTION INTRAMUSCULAR; INTRAVENOUS; SUBCUTANEOUS at 06:17

## 2020-01-28 RX ADMIN — HYDROMORPHONE HYDROCHLORIDE 0.5 MILLIGRAM(S): 2 INJECTION INTRAMUSCULAR; INTRAVENOUS; SUBCUTANEOUS at 11:13

## 2020-01-28 RX ADMIN — HYDROMORPHONE HYDROCHLORIDE 0.5 MILLIGRAM(S): 2 INJECTION INTRAMUSCULAR; INTRAVENOUS; SUBCUTANEOUS at 09:27

## 2020-01-28 RX ADMIN — HYDROMORPHONE HYDROCHLORIDE 0.2 MILLIGRAM(S): 2 INJECTION INTRAMUSCULAR; INTRAVENOUS; SUBCUTANEOUS at 01:17

## 2020-01-28 RX ADMIN — HYDROMORPHONE HYDROCHLORIDE 0.5 MILLIGRAM(S): 2 INJECTION INTRAMUSCULAR; INTRAVENOUS; SUBCUTANEOUS at 03:27

## 2020-01-28 RX ADMIN — Medication 1 SPRAY(S): at 09:53

## 2020-01-28 RX ADMIN — HYDROMORPHONE HYDROCHLORIDE 0.5 MILLIGRAM(S): 2 INJECTION INTRAMUSCULAR; INTRAVENOUS; SUBCUTANEOUS at 15:00

## 2020-01-28 NOTE — PROGRESS NOTE ADULT - PROBLEM SELECTOR PROBLEM 1
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

## 2020-01-28 NOTE — DISCHARGE NOTE NURSING/CASE MANAGEMENT/SOCIAL WORK - NSDCPEEMAIL_GEN_ALL_CORE
[facility] email [email or web address]

## 2020-01-28 NOTE — CHART NOTE - NSCHARTNOTEFT_GEN_A_CORE
PALLIATIVE MEDICINE COORDINATION OF CARE NOTE FOR VALERIO FUENTES    Patient last seen on: __1/27/2020____ in order to manage: __pain____ and was recommended: __Dilaudid 0.2mg q6h ATC with Dilaudid 0.5mg q2h IV PRN._____      __30____ Minutes; Start: _1030____  End: _11am___, of non-face-to-face prolonged service provided that relates to (face-to-face) care that has or will occur and ongoing patient management, including one or more of the following:     - Reviewed records from other physicians or other health care professional services, including one or more of the following: other medical records and diagnostic / radiology study results     HPI:  79 year old male former smoker with PMHX HTN, HLD, metastic prostate cancer to bone (s/p chemo, last dose 08/2019), recent dx DVT/PE this month (on eliquis), idiopathic pulmonary fibrosis/COPD (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 He states he was laying in bed when he suddenly experienced non-radiating, non-exertional 6/10 midsternal chest pressure. No associated SOB, dizziness, palpitations, N/V, or diaphoresis. Has never experienced pain like this before, despite having stents placed in past. Resolved only after receiving SL nitro by EMS and since then has not reoccurred. Also reports poor PO intake due to food tasting bad (side effect of chemo)  In ER, patient CP-free, afebrile and initially hemodynamically stable but then became hypotensive to 60s/40s after receiving additional dose of SL nitro, Cards fellow in ED did bedside echo showing preserved EF, received IVF bolus and BP normalized. EKG SR with non-specific ST-T changes. Trop negative x1, BNP 1018. CXR with bilateral infiltrates, CTA PE protocol negative for PE, showing pulmonary emphysema and pulmonary fibrosis, probable bronchial mucoid impaction in the right lower lobe. HE received aspirin 325mg.     Admitted to [facility] for management of unstable angina. (25 Jan 2020 15:40)      - Other: Medication reviewed.    Patient required 3 PRNs of his Dilaudid 0.5mg. in 24 hours.     MEDICATIONS  (STANDING):  amLODIPine   Tablet 5 milliGRAM(s) Oral daily  apixaban 5 milliGRAM(s) Oral every 12 hours  carvedilol 12.5 milliGRAM(s) Oral every 12 hours  dorzolamide 2%/timolol 0.5% Ophthalmic Solution 1 Drop(s) Both EYES two times a day  HYDROmorphone  Injectable 0.2 milliGRAM(s) IV Push every 6 hours  latanoprost 0.005% Ophthalmic Solution 1 Drop(s) Both EYES at bedtime  losartan 50 milliGRAM(s) Oral daily  melatonin 6 milliGRAM(s) Oral at bedtime  multivitamin 1 Tablet(s) Oral daily  polyethylene glycol 3350 17 Gram(s) Oral daily  prednisoLONE acetate 1% Suspension 1 Drop(s) Both EYES daily  senna 2 Tablet(s) Oral at bedtime  sucralfate suspension 0.5 Gram(s) Oral four times a day    MEDICATIONS  (PRN):  HYDROmorphone  Injectable 0.5 milliGRAM(s) IV Push every 2 hours PRN moderate to severe pain  sodium chloride 0.65% Nasal 1 Spray(s) Both Nostrils every 4 hours PRN Nasal Congestion  sodium chloride 3%  Inhalation 3 milliLiter(s) Inhalation three times a day PRN softening mucus      - Other: Advanced directives     Patient made himself a DNR/DNI yesterday. MOLST in chart.     - Other: Coordination/Plan of care     Discussed with primary team and nursing.    Plan is for patient to go to [facility] today and be kept comfortable until he passes away. Family is in agreement with the plan. Patient to leave today at 4pm. Current he is comfortable with current regimen. Please give Bolus prior to discharge.

## 2020-01-28 NOTE — PROGRESS NOTE ADULT - ASSESSMENT
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      Neurosurgery consulted on 1/26/2020 for b/l hand weakness a/w numbness and tingling, in addition to evaluation of LBP not a/w saddle anesthesia or urinary incontinence.  Per Dr. Pugh, MRI C/T/L spine w/ & w/o contrast ordered for further evaluation. Assessment:   80 yo M former smoker w/ PMHX HTN, HLD, metastic prostate cancer to bone (s/p chemo, last dose 08/2019), recent dx DVT/PE this month (on eliquis), idiopathic pulmonary fibrosis/COPD (not on home O2), and CAD s/p PCI 2 years ago at [facility] and [facility] within past year without intervention, who [facility] and [facility] to [facility] on 1/25/2020.      Neurosurgery consulted on 1/26/2020 for b/l hand weakness a/w numbness and tingling, in addition to evaluation of LBP not a/w saddle anesthesia or urinary incontinence.  Per Dr. Pugh, MRI C/T/L spine w/ & w/o contrast ordered for further evaluation.

## 2020-01-28 NOTE — PROGRESS NOTE ADULT - PROBLEM SELECTOR PLAN 2
- no evidence of spinal cord/cauda equina compression, MRI shows degenerative changes of spine  - pt can f/u w/ Dr. Pugh outpt regarding lower back pain and extremity weakness - no evidence of spinal cord compression, MRI shows degenerative changes of spine   - pt can f/u w/ Dr. Pugh outpt regarding b/l hand numbness/tingling  - no further neurosurgical intervention needed at this time - no evidence of spinal cord compression, MRI shows degenerative changes of spine     - no further neurosurgical intervention needed at this time - no evidence of spinal cord compression, MRI shows degenerative changes of spine   - no further neurosurgical intervention needed at this time  - Discussed w/ Dr. Pugh

## 2020-01-28 NOTE — DISCHARGE NOTE NURSING/CASE MANAGEMENT/SOCIAL WORK - PATIENT PORTAL LINK FT
You can access the FollowMyHealth Patient Portal offered by [facility] by registering at the following website: [email or web address]. By joining [facility]’s FollowMyHealth portal, you will also be able to view your health information using other applications (apps) compatible with our system.

## 2020-01-28 NOTE — PROVIDER CONTACT NOTE (OTHER) - RECOMMENDATIONS
Pt needs all meds to be switched to IV and needs IV fluids to prevent dehydration.
Continue to monitor.

## 2020-01-28 NOTE — PROGRESS NOTE ADULT - PROBLEM SELECTOR PROBLEM 2
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

## 2020-01-28 NOTE — DISCHARGE NOTE PROVIDER - NSDCMRMEDTOKEN_GEN_ALL_CORE_FT
amLODIPine 5 mg oral tablet: 1 tab(s) orally once a day  ascorbic acid 500 mg oral tablet: 1 tab(s) orally 2 times a day  baclofen 5 mg oral tablet: 1 tab(s) orally 3 times a day, As Needed  bimatoprost 0.01% ophthalmic solution: 1 drop(s) to each affected eye once a day (in the evening)  carvedilol 12.5 mg oral tablet: 1 tab(s) orally 2 times a day  dorzolamide-timolol 2.23%-0.68% ophthalmic solution: 1 drop(s) to each affected eye 2 times a day  Eliquis 5 mg oral tablet: 1 tab(s) orally every 12 hours  HYDROmorphone: 0.2 milligram(s) injectable every 6 hours  HYDROmorphone: 0.5 milligram(s) injectable  As Needed  losartan 50 mg oral tablet: 1 tab(s) orally once a day  Melatonin 3 mg oral tablet: 2 tab(s) orally once a day (at bedtime)  nitroglycerin 0.4 mg sublingual tablet: 1 tab(s) sublingual every 5 minutes, As Needed  prednisoLONE acetate 1% ophthalmic suspension: 1 drop(s) to each affected eye once a day  sucralfate 1 g/10 mL oral suspension: 5 milliliter(s) orally 4 times a day (before meals and at bedtime) amLODIPine 5 mg oral tablet: 1 tab(s) orally once a day  ascorbic acid 500 mg oral tablet: 1 tab(s) orally 2 times a day  baclofen 5 mg oral tablet: 1 tab(s) orally 3 times a day, As Needed  bimatoprost 0.01% ophthalmic solution: 1 drop(s) to each affected eye once a day (in the evening)  carvedilol 12.5 mg oral tablet: 1 tab(s) orally 2 times a day  dorzolamide-timolol 2.23%-0.68% ophthalmic solution: 1 drop(s) to each affected eye 2 times a day  Eliquis 5 mg oral tablet: 1 tab(s) orally every 12 hours  HYDROmorphone: 0.2 milligram(s) injectable every 6 hours  HYDROmorphone: 0.5 milligram(s) injectable every 2 hours, As Needed  losartan 50 mg oral tablet: 1 tab(s) orally once a day  Melatonin 3 mg oral tablet: 2 tab(s) orally once a day (at bedtime)  nitroglycerin 0.4 mg sublingual tablet: 1 tab(s) sublingual every 5 minutes, As Needed  prednisoLONE acetate 1% ophthalmic suspension: 1 drop(s) to each affected eye once a day  sucralfate 1 g/10 mL oral suspension: 5 milliliter(s) orally 4 times a day (before meals and at bedtime)

## 2020-01-28 NOTE — DISCHARGE NOTE PROVIDER - HOSPITAL COURSE
79 year old male former smoker with PMHX HTN, HLD, metastic prostate cancer to bone (s/p chemo, last dose 08/2019), recent dx DVT/PE this month (on eliquis), idiopathic pulmonary fibrosis/COPD (not on home O2), and CAD s/p PCI 2 years ago at [facility] and [facility] within past year without intervention, who was BIBEMS from [facility] for chest pain since this morning. He states he was laying in bed when he suddenly experienced non-radiating, non-exertional 6/10 midsternal chest pressure. No associated SOB, dizziness, palpitations, N/V, or diaphoresis. Has never experienced pain like this before, despite having stents placed in past. Resolved only after receiving SL nitro by EMS and since then has not reoccurred. Also reports poor PO intake due to food tasting bad (side effect of chemo)    In ER, patient CP-free, afebrile and initially hemodynamically stable but then became hypotensive to 60s/40s after receiving additional dose of SL nitro, Cards fellow in ED did bedside echo showing preserved EF, received IVF bolus and BP normalized. EKG SR with non-specific ST-T changes. Trop negative x3, BNP 1018. CXR with bilateral infiltrates, CTA PE protocol negative for PE, showing pulmonary emphysema and pulmonary fibrosis, probable bronchial mucoid impaction in the right lower lobe. He received aspirin 325mg. Admitted to [facility] for management of unstable angina.        On [facility], patient was placed on telemetry monitoring. BPs were noted to be in the 140s-170s. In addition to home carvedilol 12.5mg BID and Losartan 50mg QD, Amlodipine 5mg QD was added with positive results. Pt was transitioned to 5mg Eliquis BID as of 1/28/2020 after being on 10mg BID [facility]). Speech and swallow were consulted and due to patient requesting end of life care, normal cardiac consistent diet in place. Of note, patient is aware of aspiration risk and uses wall suction to manage secretions as needed. Palliative care saw the patient and discussed end of life care. Pt was referred for inpatient hospice care and accepted to [facility] on 1/28/2020. In light of this, pt decided that he only wants medications for comfort and with d/c atorvastatin and ASA daily. Per palliative, pt to continue Dilaudid 0.2mg q 6hrs ATC with Dilaudid 0.5mg q2hrs IV PRN.        Hospital course was complicated on 1/26/2020 during Cardiology rounds, stroke code was called as patient endorsed bilateral hand weakness to the Cards PA stating that he could not touch the buttons to use his phone. On arrival, it was determined that patient had been having difficulty using his phone since waking 1/25 am and was last able to use his phone normally evening on 1/24. Stroke code was cancelled with plan for neurology consult. Per neurology, patient has metastatic prostate cancer with mets to the bone, and there is concern for cspine coard compression from mets. MRI 1/26/2020: Heterogeneous marrow signal intensity, background of osteopenia, marrow replacement process, not excluding metastatic disease. A focal lesion within the L5 with STIR signal, suggesting metastatic disease. Left renal cortical cyst is present. Heterogeneous signal of the sacrum and the iliac bones, suggesting metastatic disease. Neurology recommended X spine Xray with flexion and extension however pt refused and does not want further testing. CT was cancelled per Dr. Trujillo.        Patient was seen and examined at the bedside. Patient states that he feels well today and has no complaints. Pt denies CP, SOB, abdominal pain, N/V. PE WNL. HD stable. Telemetry significant for sinus tachycardia in the 120s overnight with self conversion into normal sinus rhythm on his own. As per Dr. Trujillo, patient is stable for discharge to inpatient [facility]. 79 year old male former smoker with PMHX HTN, HLD, metastic prostate cancer to bone (s/p chemo, last dose 08/2019), recent dx DVT/PE this month (on eliquis), idiopathic pulmonary fibrosis/COPD (not on home O2), and CAD s/p PCI 2 years ago at [facility] and [facility] within past year without intervention, who was BIBEMS from [facility] for chest pain since this morning. He states he was laying in bed when he suddenly experienced non-radiating, non-exertional 6/10 midsternal chest pressure. No associated SOB, dizziness, palpitations, N/V, or diaphoresis. Has never experienced pain like this before, despite having stents placed in past. Resolved only after receiving SL nitro by EMS and since then has not reoccurred. Also reports poor PO intake due to food tasting bad (side effect of chemo)    In ER, patient CP-free, afebrile and initially hemodynamically stable but then became hypotensive to 60s/40s after receiving additional dose of SL nitro, Cards fellow in ED did bedside echo showing preserved EF, received IVF bolus and BP normalized. EKG SR with non-specific ST-T changes. Trop negative x3, BNP 1018. CXR with bilateral infiltrates, CTA PE protocol negative for PE, showing pulmonary emphysema and pulmonary fibrosis, probable bronchial mucoid impaction in the right lower lobe. He received aspirin 325mg. Admitted to [facility] for management of unstable angina.        On [facility], patient was placed on telemetry monitoring. BPs were noted to be in the 140s-170s. In addition to home carvedilol 12.5mg BID and Losartan 50mg QD, Amlodipine 5mg QD was added with positive results. Pt was transitioned to 5mg Eliquis BID as of 1/28/2020 after being on 10mg BID for [facility]). Speech and swallow were consulted and due to patient requesting end of life care, normal cardiac consistent diet in place. Of note, patient is aware of aspiration risk and uses wall suction to manage secretions as needed. Palliative care saw the patient and discussed end of life care. Pt was referred for inpatient hospice care and accepted to [facility] on 1/28/2020. In light of this, pt decided that he only wants medications for comfort and with d/c atorvastatin and ASA daily. Per palliative, pt to continue Dilaudid 0.2mg q 6hrs ATC with Dilaudid 0.5mg q2hrs IV PRN.        Hospital course was complicated on 1/26/2020 during Cardiology rounds, stroke code was called as patient endorsed bilateral hand weakness to the Cards PA stating that he could not touch the buttons to use his phone. On arrival, it was determined that patient had been having difficulty using his phone since waking 1/25 am and was last able to use his phone normally evening on 1/24. Stroke code was cancelled with plan for neurology consult. Per neurology, patient has metastatic prostate cancer with mets to the bone, and there is concern for cspine coard compression from mets. MRI 1/26/2020: Heterogeneous marrow signal intensity, background of osteopenia, marrow replacement process, not excluding metastatic disease. A focal lesion within the L5 with STIR signal, suggesting metastatic disease. Left renal cortical cyst is present. Heterogeneous signal of the sacrum and the iliac bones, suggesting metastatic disease. Neurology recommended X spine Xray with flexion and extension however pt refused and does not want further testing. CT was cancelled per Dr. Trujillo.        Patient was seen and examined at the bedside. Patient states that he feels well today and has no complaints. Pt denies CP, SOB, abdominal pain, N/V. PE WNL. HD stable. Telemetry significant for sinus tachycardia in the 120s overnight with self conversion into normal sinus rhythm on his own. As per Dr. Trujillo, patient is stable for discharge to inpatient [facility].        attg attestation:    htn controlled    arm weakness no further workup per patient    requesting hospice care

## 2020-01-28 NOTE — DISCHARGE NOTE PROVIDER - NSDCCPCAREPLAN_GEN_ALL_CORE_FT
PRINCIPAL DISCHARGE DIAGNOSIS  Diagnosis: Prostate cancer  Assessment and Plan of Treatment: You have a known diagnosis of prostate cancer with mets to the spinal cord. Palliative care saw you and started you on a regimen for pain. The medication they started you on is called Dilaudid. They have you on 0.2mg every 6hrs and 0.5mg every 2 hrs IV as needed. Please continue this with hospice service for your comfort.      SECONDARY DISCHARGE DIAGNOSES  Diagnosis: Chest pain  Assessment and Plan of Treatment: You were found to have mets from your prostate cancer on your sternum. Your pain was likely related to that. You and palliative care have agreed to only continue comfort medications. For this reason your dialy 81mg aspirin and 80mg atorvastatin will be discontinued.    Diagnosis: Pulmonary embolus  Assessment and Plan of Treatment: You were diagnosed with a blood clot in the [facility] scans were negative for a clot here. To prevent another clot you were started on Eliquis 5mg twice daily. Please continue this to prevent more clots    Diagnosis: Hypertension  Assessment and Plan of Treatment: You have a history of high blood pressure. Amlodipine 5mg daily was added to your regimen. Please continue this in addition to your home 12.5mg Coreg twice daily and Losartan 50mg daily.

## 2020-01-28 NOTE — PROGRESS NOTE ADULT - SUBJECTIVE AND OBJECTIVE BOX
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 for chest pain since this morning. He states he was laying in bed when he suddenly experienced non-radiating, non-exertional 6/10 midsternal chest pressure. No associated SOB, dizziness, palpitations, N/V, or diaphoresis. Has never experienced pain like this before, despite having stents placed in past. Resolved only after receiving SL nitro by EMS and since then has not reoccurred. Also reports poor PO intake due to food tasting bad (side effect of chemo)  In ER, patient CP-free, afebrile and initially hemodynamically stable but then became hypotensive to 60s/40s after receiving additional dose of SL nitro, Cards fellow in ED did bedside echo showing preserved EF, received IVF bolus and BP normalized. EKG SR with non-specific ST-T changes. Trop negative x1, BNP 1018. CXR with bilateral infiltrates, CTA PE protocol negative for PE, showing pulmonary emphysema and pulmonary fibrosis, probable bronchial mucoid impaction in the right lower lobe. HE received aspirin 325mg.     Admitted to 5 [facility] for management of unstable angina. (25 Jan 2020 15:40)    PAST MEDICAL HISTORY   Pulmonary fibrosis  Neoplasm of bone  Bladder neoplasm  Hyperlipidemia  Emphysema of lung  Stented coronary artery    PAST SURGICAL HISTORY   H/O hernia repair  S/P cataract surgery        MEDICATIONS:  Antibiotics:    Neuro:  HYDROmorphone  Injectable 0.2 milliGRAM(s) IV Push every 6 hours  HYDROmorphone  Injectable 0.5 milliGRAM(s) IV Push every 2 hours PRN  melatonin 6 milliGRAM(s) Oral at bedtime    Anticoagulation:  apixaban 5 milliGRAM(s) Oral every 12 hours    Other:  amLODIPine   Tablet 5 milliGRAM(s) Oral daily  carvedilol 12.5 milliGRAM(s) Oral every 12 hours  losartan 50 milliGRAM(s) Oral daily  multivitamin 1 Tablet(s) Oral daily  polyethylene glycol 3350 17 Gram(s) Oral daily  senna 2 Tablet(s) Oral at bedtime  sodium chloride 3%  Inhalation 3 milliLiter(s) Inhalation three times a day PRN  sucralfate suspension 0.5 Gram(s) Oral four times a day      SOCIAL HISTORY:   Occupation:   Marital Status:     FAMILY HISTORY:  No pertinent family history in first degree relatives      REVIEW OF SYSTEMS:  Check here if all are normal other than Neurological []  General: generalized weakness, no recent illness   Eyes: negative  ENT: negative  Cardiac: chest pain   Respiratory: no coughing/wheezing, SOB  GI: negative  : no urinary or bowel incontinence  Musculoskeletal: lower back pain   Skin: negative   Neurologic: numbness/tingling of hands b/l, lower back pain. No saddle anesthesia.   Psychiatric: negative    PHYSICAL EXAMINATION:   T(C): 36.3 (01-28-20 @ 08:35), Max: 36.8 (01-28-20 @ 05:40)  HR: 103 (01-28-20 @ 11:46) (75 - 103)  BP: 179/101 (01-28-20 @ 11:46) (109/64 - 179/101)  RR: 16 (01-28-20 @ 11:46) (16 - 20)  SpO2: 97% (01-28-20 @ 11:46) (96% - 100%)  Wt(kg): --    Examination:  General:   Neuro:   Cardiac:   Pulmonary:   GI:   Extremities:         Other:     LABS:                        10.5   6.39  )-----------( 295      ( 27 Jan 2020 06:52 )             36.2     01-27    142  |  100  |  18  ----------------------------<  107<H>  3.9   |  24  |  0.72    Ca    9.2      27 Jan 2020 06:52  Mg     2.1     01-27          RADIOLOGY & ADDITIONAL STUDIES:   < from: MR Cervical Spine No Cont (01.26.20 @ 23:21) >    IMPRESSION:  1. There is no focal osseous lesion of the cervical spine with STIR signal, but early subtle metastatic disease is not entirely excluded given the heterogeneous marrow signal.  2. There is mild compression of the T2 superior endplate, there is superior endplate Schmorl's node, and a small focal lesion, not excluding metastatic disease.    < from: MR Thoracic Spine No Cont (01.26.20 @ 23:21) >      < from: MR Thoracic Spine No Cont (01.26.20 @ 23:21) >    IMPRESSION:  1. A heterogeneous marrow signal intensity, marrow replacement process, not excluding metastatic disease.  2. A mild chronic compression of the T6 inferior endplate.  3. There is mild compression of the T2 superior endplate, there is superior endplate Schmorl's node, and a small focal lesion, not excluding metastatic disease.    < from: MR Lumbar Spine No Cont (01.26.20 @ 23:22) >  IMPRESSION:  1. Heterogeneous marrow signal intensity, background of osteopenia, marrow replacement process, not excluding metastatic disease.  2. A focal lesion within the L5 with STIR signal, suggesting metastatic disease.  3. Left renal cortical cyst is present.  4. Heterogeneous signal of the sacrum and the iliac bones, suggesting metastatic disease. 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 He states he was laying in bed when he suddenly experienced non-radiating, non-exertional 6/10 midsternal chest pressure. No associated SOB, dizziness, palpitations, N/V, or diaphoresis. Has never experienced pain like this before, despite having stents placed in past. Resolved only after receiving SL nitro by EMS and since then has not reoccurred. Also reports poor PO intake due to food tasting bad (side effect of chemo)  In ER, patient CP-free, afebrile and initially hemodynamically stable but then became hypotensive to 60s/40s after receiving additional dose of SL nitro, Cards fellow in ED did bedside echo showing preserved EF, received IVF bolus and BP normalized. EKG SR with non-specific ST-T changes. Trop negative x1, BNP 1018. CXR with bilateral infiltrates, CTA PE protocol negative for PE, showing pulmonary emphysema and pulmonary fibrosis, probable bronchial mucoid impaction in the right lower lobe. HE received aspirin 325mg.     Admitted to 5 [facility] for management of unstable angina. (25 Jan 2020 15:40)    PAST MEDICAL HISTORY   Pulmonary fibrosis  Neoplasm of bone  Bladder neoplasm  Hyperlipidemia  Emphysema of lung  Stented coronary artery    PAST SURGICAL HISTORY   H/O hernia repair  S/P cataract surgery        MEDICATIONS:  Antibiotics:    Neuro:  HYDROmorphone  Injectable 0.2 milliGRAM(s) IV Push every 6 hours  HYDROmorphone  Injectable 0.5 milliGRAM(s) IV Push every 2 hours PRN  melatonin 6 milliGRAM(s) Oral at bedtime    Anticoagulation:  apixaban 5 milliGRAM(s) Oral every 12 hours    Other:  amLODIPine   Tablet 5 milliGRAM(s) Oral daily  carvedilol 12.5 milliGRAM(s) Oral every 12 hours  losartan 50 milliGRAM(s) Oral daily  multivitamin 1 Tablet(s) Oral daily  polyethylene glycol 3350 17 Gram(s) Oral daily  senna 2 Tablet(s) Oral at bedtime  sodium chloride 3%  Inhalation 3 milliLiter(s) Inhalation three times a day PRN  sucralfate suspension 0.5 Gram(s) Oral four times a day      SOCIAL HISTORY:   Occupation:   Marital Status:     FAMILY HISTORY:  No pertinent family history in first degree relatives      REVIEW OF SYSTEMS:  Check here if all are normal other than Neurological []  General: generalized weakness, no recent illness   Eyes: negative  ENT: negative  Cardiac: chest pain   Respiratory: no coughing/wheezing, SOB  GI: negative  : no urinary or bowel incontinence  Musculoskeletal: lower back pain   Skin: negative   Neurologic: numbness/tingling of hands b/l, lower back pain. No saddle anesthesia.   Psychiatric: negative    PHYSICAL EXAMINATION:   T(C): 36.3 (01-28-20 @ 08:35), Max: 36.8 (01-28-20 @ 05:40)  HR: 103 (01-28-20 @ 11:46) (75 - 103)  BP: 179/101 (01-28-20 @ 11:46) (109/64 - 179/101)  RR: 16 (01-28-20 @ 11:46) (16 - 20)  SpO2: 97% (01-28-20 @ 11:46) (96% - 100%)  Wt(kg): --    Physical Examination:  General: pt lying comfortably in hospital bed, NAD   Neuro:   - A&O x3, following commands   - CN II-XII grossly intact: PERRL b/l, EOMI b/l, no facial asymmetry   - no pronator drift   - CUELLO x4 spont, strength 3/5 x4 extremities   - SLIT throughout   Cardiac: regular rate and rhythm, normal S1 and S2   Pulmonary: lungs clear to auscultation throughout b/l   GI: abd soft, NTND, normoactive BS   Extremities: warm, dry, distal pulses 2+ throughout b/l         Other:     LABS:                        10.5   6.39  )-----------( 295      ( 27 Jan 2020 06:52 )             36.2     01-27    142  |  100  |  18  ----------------------------<  107<H>  3.9   |  24  |  0.72    Ca    9.2      27 Jan 2020 06:52  Mg     2.1     01-27          RADIOLOGY & ADDITIONAL STUDIES:   < from: MR Cervical Spine No Cont (01.26.20 @ 23:21) >    IMPRESSION:  1. There is no focal osseous lesion of the cervical spine with STIR signal, but early subtle metastatic disease is not entirely excluded given the heterogeneous marrow signal.  2. There is mild compression of the T2 superior endplate, there is superior endplate Schmorl's node, and a small focal lesion, not excluding metastatic disease.    < from: MR Thoracic Spine No Cont (01.26.20 @ 23:21) >      < from: MR Thoracic Spine No Cont (01.26.20 @ 23:21) >    IMPRESSION:  1. A heterogeneous marrow signal intensity, marrow replacement process, not excluding metastatic disease.  2. A mild chronic compression of the T6 inferior endplate.  3. There is mild compression of the T2 superior endplate, there is superior endplate Schmorl's node, and a small focal lesion, not excluding metastatic disease.    < from: MR Lumbar Spine No Cont (01.26.20 @ 23:22) >  IMPRESSION:  1. Heterogeneous marrow signal intensity, background of osteopenia, marrow replacement process, not excluding metastatic disease.  2. A focal lesion within the L5 with STIR signal, suggesting metastatic disease.  3. Left renal cortical cyst is present.  4. Heterogeneous signal of the sacrum and the iliac bones, suggesting metastatic disease.

## 2020-01-28 NOTE — PROGRESS NOTE ADULT - PROBLEM SELECTOR PLAN 1
- no evidence of spinal cord/cauda equina compression, MRI shows degenerative changes of spine  - pt can f/u w/ Dr. Pugh outpt regarding lower back pain and extremity weakness  - no further neurosurgical intervention needed at this time - no evidence of spinal cord/cauda equina compression, MRI shows degenerative changes of spine    - no further neurosurgical intervention needed at this time - no evidence of spinal cord/cauda equina compression, MRI shows degenerative changes of spine  - no further neurosurgical intervention needed at this time  - discussed w/ Dr. Pugh

## 2020-01-28 NOTE — PROGRESS NOTE ADULT - PROBLEM SELECTOR PROBLEM 3
R/O Numbness and tingling in both hands
Pulmonary embolism
Pulmonary emphysema, unspecified emphysema type
Pulmonary emphysema, unspecified emphysema type
Pulmonary embolism

## 2020-01-28 NOTE — DISCHARGE NOTE NURSING/CASE MANAGEMENT/SOCIAL WORK - NSDCPEWEB_GEN_ALL_CORE
[place] website --- [email or web address] Tobacco [email or web address] website --- [email or web address]

## 2020-01-28 NOTE — PROGRESS NOTE ADULT - ASSESSMENT
- in accordance with goals of care, no further imaging or workup is indicated  - discussed with palliative and family  - can consider palliative trial of Decadron for weakness to see if there is any improvement    Neurology service will continue to follow on an as-needed basis until patient is transferred to hospice.

## 2020-01-28 NOTE — PROGRESS NOTE ADULT - SUBJECTIVE AND OBJECTIVE BOX
NEUROLOGY CONSULT FOLLOW-UP NOTE    CHIEF COMPLAINT:  CHEST PAIN    INTERVAL HISTORY: Patient's pain was controlled using Dilaudid. He made the determination, with the agreement of his wife, for dispo to inpatient hospice. No further testing or workup for weakness dictated by goals of care.     REVIEW OF SYSTEMS:  As per HPI, otherwise negative for Constitutional, Eyes, Ears/Nose/Mouth/Throat, Neck, Cardiovascular, Respiratory, Gastrointestinal, Genitourinary, Skin, Endocrine, Musculoskeletal, Psychiatric, and Hematologic/Lymphatic.    Vital Signs Last 24 Hrs  T(C): 36.3 (28 Jan 2020 08:35), Max: 36.8 (28 Jan 2020 05:40)  T(F): 97.4 (28 Jan 2020 08:35), Max: 98.3 (28 Jan 2020 05:40)  HR: 103 (28 Jan 2020 11:46) (75 - 103)  BP: 179/101 (28 Jan 2020 11:46) (109/64 - 179/101)  BP(mean): 133 (28 Jan 2020 11:46) (82 - 133)  RR: 16 (28 Jan 2020 11:46) (16 - 20)  SpO2: 97% (28 Jan 2020 11:46) (96% - 100%)    PHYSICAL EXAMINATION:  Constitutional: Thin adult  male, jaundiced, chronically ill appearing, dysarthric, in no acute cardiopulmonary distress  Neurologic:  -Mental status: Awake, alert, oriented to person, place, and time. Speech hoarse, patients speaks in brief phrases, coughing and taking deep breathes between speaking. Patient with intact naming, repetition, and comprehension. Follows commands. Slightly decreased attention/concentration. Knows Trump as President  -Cranial nerves:   II: Visual fields are full to confrontation  III, IV, VI: Extraocular movements are intact without nystagmus. Pupils equally round and reactive to light  V:  Facial sensation V1-V3 equal and intact   VII: Trace right facial (at baseline per wife)  Motor: Bilateral pronator drift. Variable effort through strength exam, at max force bilateral hand  is 4/5 while bilateral wrist flexion/extension, bicep/tricep/deltoid is +4/5. Variable effort through strength exam, at max force patient with 4+/5 strength in bilateral hip flexors, knee flexion/extension, dorsi/plantarflexion.   Sensation: Intact to light touch bilaterally. No neglect or extinction on double simultaneous testing.  Coordination: No dysmetria on finger-to-nose  Reflexes: Downgoing toes bilaterally. Mute reflexes: jaw, b/l pectoralis, b/l brachioradialis, b/l patellar   Proprioception: Intact on right hand. Decreased on Left hand 50%. Unable to determine proprioception on toes.     LABS: reviewed    RADIOLOGY & ADDITIONAL STUDIES:  Brain imaging reviewed. MRI whole spine reviewed.    IMPRESSION & RECOMMENDATIONS:

## 2020-01-28 NOTE — PROGRESS NOTE ADULT - ATTENDING COMMENTS
concerned for cord compression consult neuro and neurosurgery  follow up pulmonary reccs   stop asa
Pt seen and examined - agree with resident note
Pt seen and examined with the resident- pt will be going to Hospice today
Labs reviewed. CT chest reviewed by me (Emphysema with b/l lower lobe ILD with dilated esophagus and b/l lower lobe infiltrate). History gathered from health care proxy. Patient is c/o muscular weakness.  Plan:  - Agree with palliative care consult  - Duoneb Q4hr  - Aspiration precautions.   - Management of GERD  - Rest as above
palliative to help with respiratory distress  htn controlled  for hospice

## 2020-01-28 NOTE — PROGRESS NOTE ADULT - PROBLEM SELECTOR PLAN 3
- no evidence of spinal cord compression, MRI shows degenerative changes of spine   - pt can f/u w/ Dr. Pugh outpt regarding b/l hand numbness/tingling
-dx at [facility] last week. CTA chest negative for PE here  -Was on eliquis 10mg BID until 1/26, transitioned to 5mg BID today
Centrilobular and paraseptal emphysema  - Patients with CPFE can have accelerated destructive emphysema which is what this patient may have had in the upper lobes.   - Duonebs as q4-6hrs  - O2 to keep sats >88%    Patient s/e/d with pulmonary attending Dr. Hyatt
Centrilobular and paraseptal emphysema  - Patients with CPFE can have accelerated destructive emphysema which is what this patient may have had in the upper lobes.   - Duonebs as q4-6hrs  - O2 to keep sats >88%  - Can consider sending sputum culture  - Spirometry in the am  - Thank you for this interesting consult, we will continue to follow
-dx at [facility] last week. CTA chest negative for PE here  -continue eliquis 10mg BID until 1/26, transition to 5mg BID on 1/27

## 2020-01-28 NOTE — PROVIDER CONTACT NOTE (OTHER) - ASSESSMENT
Pt coughing, chocking, unable to swallow.
Pt. , /75, RR 20 on 4L NC, sleeping. Patient refused all PO evening meds due to trouble swallowing and coughing. PA Aware.

## 2020-01-28 NOTE — PROVIDER CONTACT NOTE (OTHER) - BACKGROUND
Pt. has metastatic prostate cancer to bone with recent dx. of DVT/PE this month.  Hx of HTN, HLD, pulmonary fibrosis, with bilateral arm and leg weakness. DNR/DNI as of yesterday.

## 2020-02-03 DIAGNOSIS — G62.0 DRUG-INDUCED POLYNEUROPATHY: ICD-10-CM

## 2020-02-03 DIAGNOSIS — E43 UNSPECIFIED SEVERE PROTEIN-CALORIE MALNUTRITION: ICD-10-CM

## 2020-02-03 DIAGNOSIS — I25.110 ATHEROSCLEROTIC HEART DISEASE OF NATIVE CORONARY ARTERY WITH UNSTABLE ANGINA PECTORIS: ICD-10-CM

## 2020-02-03 DIAGNOSIS — Z95.5 PRESENCE OF CORONARY ANGIOPLASTY IMPLANT AND GRAFT: ICD-10-CM

## 2020-02-03 DIAGNOSIS — I95.9 HYPOTENSION, UNSPECIFIED: ICD-10-CM

## 2020-02-03 DIAGNOSIS — J96.01 ACUTE RESPIRATORY FAILURE WITH HYPOXIA: ICD-10-CM

## 2020-02-03 DIAGNOSIS — Z51.5 ENCOUNTER FOR PALLIATIVE CARE: ICD-10-CM

## 2020-02-03 DIAGNOSIS — E87.6 HYPOKALEMIA: ICD-10-CM

## 2020-02-03 DIAGNOSIS — R53.2 FUNCTIONAL QUADRIPLEGIA: ICD-10-CM

## 2020-02-03 DIAGNOSIS — K21.9 GASTRO-ESOPHAGEAL REFLUX DISEASE WITHOUT ESOPHAGITIS: ICD-10-CM

## 2020-02-03 DIAGNOSIS — Z92.21 PERSONAL HISTORY OF ANTINEOPLASTIC CHEMOTHERAPY: ICD-10-CM

## 2020-02-03 DIAGNOSIS — Z66 DO NOT RESUSCITATE: ICD-10-CM

## 2020-02-03 DIAGNOSIS — G95.29 OTHER CORD COMPRESSION: ICD-10-CM

## 2020-02-03 DIAGNOSIS — J84.112 IDIOPATHIC PULMONARY FIBROSIS: ICD-10-CM

## 2020-02-03 DIAGNOSIS — Z74.01 BED CONFINEMENT STATUS: ICD-10-CM
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
## 2020-02-03 DIAGNOSIS — C61 MALIGNANT NEOPLASM OF PROSTATE: ICD-10-CM

## 2020-02-03 DIAGNOSIS — F32.9 MAJOR DEPRESSIVE DISORDER, SINGLE EPISODE, UNSPECIFIED: ICD-10-CM

## 2020-02-03 DIAGNOSIS — F40.240 CLAUSTROPHOBIA: ICD-10-CM

## 2020-02-03 DIAGNOSIS — I10 ESSENTIAL (PRIMARY) HYPERTENSION: ICD-10-CM

## 2020-02-03 DIAGNOSIS — E78.5 HYPERLIPIDEMIA, UNSPECIFIED: ICD-10-CM

## 2020-02-03 DIAGNOSIS — T45.1X5A ADVERSE EFFECT OF ANTINEOPLASTIC AND IMMUNOSUPPRESSIVE DRUGS, INITIAL ENCOUNTER: ICD-10-CM

## 2020-02-03 DIAGNOSIS — R94.31 ABNORMAL ELECTROCARDIOGRAM [ECG] [EKG]: ICD-10-CM

## 2020-02-03 DIAGNOSIS — J43.8 OTHER EMPHYSEMA: ICD-10-CM

## 2020-02-03 DIAGNOSIS — Z86.711 PERSONAL HISTORY OF PULMONARY EMBOLISM: ICD-10-CM

## 2020-02-03 DIAGNOSIS — Z87.891 PERSONAL HISTORY OF NICOTINE DEPENDENCE: ICD-10-CM

## 2020-02-03 DIAGNOSIS — Z79.01 LONG TERM (CURRENT) USE OF ANTICOAGULANTS: ICD-10-CM

## 2020-02-03 DIAGNOSIS — R13.13 DYSPHAGIA, PHARYNGEAL PHASE: ICD-10-CM

## 2020-02-03 DIAGNOSIS — C79.51 SECONDARY MALIGNANT NEOPLASM OF BONE: ICD-10-CM

## 2020-12-11 NOTE — DISCHARGE NOTE NURSING/CASE MANAGEMENT/SOCIAL WORK - NSDCPEELIQUISCOMP_GEN_ALL_CORE
Called ptMarlo HOGAN to return a call to clinic. Please see below.    Apixaban/Eliquis is used to treat and prevent blood clots. If you are not able to swallow the tablets whole, they may be crushed and mixed in water, apple juice, or applesauce and promptly taken within four hours. Never skip a dose of Apixaban/Eliquis. If you forget to take your Apixaban/Eliquis, take a dose as soon as you remember. If it is almost time for your next Apixaban/Eliquis dose, wait until then and take a regular dose. DO NOT take an extra pill to ‘catch up’.  NEVER TAKE A DOUBLE DOSE. Notify your doctor that you missed a dose. Take Apixaban/Eliquis at the same time each morning and evening. Apixaban/Eliquis may be taken with other medication or food.

## 2021-12-17 NOTE — ED PROVIDER NOTE - NS ED MD DISPO ISOLATION TYPES
Implemented All Universal Safety Interventions:  [place] to call system. Call bell, personal items and telephone within reach. Instruct patient to call for assistance. Room bathroom lighting operational. Non-slip footwear when patient is off stretcher. Physically safe environment: no spills, clutter or unnecessary equipment. Stretcher in lowest position, wheels locked, appropriate side rails in place.
None

## 2024-06-19 NOTE — H&P ADULT - NSICDXFAMILYHX_GEN_ALL_CORE_FT
Transitional Care Management Telephone Call Attempt    Discharge Date: 6/15/2024  Discharge Location: [place] External Hospital: [facility]    Call Attempt Date: 6/19/2024  Call Attempt: Second  
No pertinent family history in first degree relatives